# Patient Record
Sex: MALE | Race: BLACK OR AFRICAN AMERICAN | Employment: UNEMPLOYED | URBAN - METROPOLITAN AREA
[De-identification: names, ages, dates, MRNs, and addresses within clinical notes are randomized per-mention and may not be internally consistent; named-entity substitution may affect disease eponyms.]

---

## 2017-04-08 ENCOUNTER — APPOINTMENT (OUTPATIENT)
Dept: CT IMAGING | Age: 57
End: 2017-04-08
Attending: EMERGENCY MEDICINE
Payer: COMMERCIAL

## 2017-04-08 ENCOUNTER — HOSPITAL ENCOUNTER (EMERGENCY)
Age: 57
Discharge: HOME | End: 2017-04-08
Attending: EMERGENCY MEDICINE
Payer: COMMERCIAL

## 2017-04-08 VITALS
HEART RATE: 74 BPM | RESPIRATION RATE: 18 BRPM | WEIGHT: 130 LBS | TEMPERATURE: 98.5 F | HEIGHT: 68 IN | DIASTOLIC BLOOD PRESSURE: 68 MMHG | BODY MASS INDEX: 19.7 KG/M2 | OXYGEN SATURATION: 98 % | SYSTOLIC BLOOD PRESSURE: 145 MMHG

## 2017-04-08 DIAGNOSIS — K52.9 NONINFECTIOUS GASTROENTERITIS, UNSPECIFIED TYPE: Primary | ICD-10-CM

## 2017-04-08 LAB
ALBUMIN SERPL BCP-MCNC: 2.8 G/DL (ref 3.5–5)
ALBUMIN/GLOB SERPL: 0.9 {RATIO} (ref 1.2–3.5)
ALP SERPL-CCNC: 134 U/L (ref 50–136)
ALT SERPL-CCNC: 76 U/L (ref 12–65)
ANION GAP BLD CALC-SCNC: 6 MMOL/L (ref 7–16)
AST SERPL W P-5'-P-CCNC: 46 U/L (ref 15–37)
BASOPHILS # BLD AUTO: 0 K/UL (ref 0–0.2)
BASOPHILS # BLD: 0 % (ref 0–2)
BILIRUB SERPL-MCNC: 0.3 MG/DL (ref 0.2–1.1)
BUN SERPL-MCNC: 9 MG/DL (ref 6–23)
CALCIUM SERPL-MCNC: 7.4 MG/DL (ref 8.3–10.4)
CHLORIDE SERPL-SCNC: 115 MMOL/L (ref 98–107)
CO2 SERPL-SCNC: 25 MMOL/L (ref 21–32)
CREAT SERPL-MCNC: 1.5 MG/DL (ref 0.8–1.5)
DIFFERENTIAL METHOD BLD: ABNORMAL
EOSINOPHIL # BLD: 0 K/UL (ref 0–0.8)
EOSINOPHIL NFR BLD: 0 % (ref 0.5–7.8)
ERYTHROCYTE [DISTWIDTH] IN BLOOD BY AUTOMATED COUNT: 16 % (ref 11.9–14.6)
GLOBULIN SER CALC-MCNC: 3.1 G/DL (ref 2.3–3.5)
GLUCOSE SERPL-MCNC: 140 MG/DL (ref 65–100)
HCT VFR BLD AUTO: 23.2 % (ref 41.1–50.3)
HGB BLD-MCNC: 8 G/DL (ref 13.6–17.2)
IMM GRANULOCYTES # BLD: 0 K/UL (ref 0–0.5)
IMM GRANULOCYTES NFR BLD AUTO: 0 % (ref 0–5)
LIPASE SERPL-CCNC: 57 U/L (ref 73–393)
LYMPHOCYTES # BLD AUTO: 11 % (ref 13–44)
LYMPHOCYTES # BLD: 0.6 K/UL (ref 0.5–4.6)
MCH RBC QN AUTO: 34.2 PG (ref 26.1–32.9)
MCHC RBC AUTO-ENTMCNC: 34.5 G/DL (ref 31.4–35)
MCV RBC AUTO: 99.1 FL (ref 79.6–97.8)
MONOCYTES # BLD: 0.7 K/UL (ref 0.1–1.3)
MONOCYTES NFR BLD AUTO: 14 % (ref 4–12)
NEUTS SEG # BLD: 3.8 K/UL (ref 1.7–8.2)
NEUTS SEG NFR BLD AUTO: 75 % (ref 43–78)
PLATELET # BLD AUTO: 261 K/UL (ref 150–450)
PMV BLD AUTO: 10.2 FL (ref 10.8–14.1)
POTASSIUM SERPL-SCNC: 4.1 MMOL/L (ref 3.5–5.1)
PROT SERPL-MCNC: 5.9 G/DL (ref 6.3–8.2)
RBC # BLD AUTO: 2.34 M/UL (ref 4.23–5.67)
SODIUM SERPL-SCNC: 146 MMOL/L (ref 136–145)
WBC # BLD AUTO: 5 K/UL (ref 4.3–11.1)

## 2017-04-08 PROCEDURE — 74177 CT ABD & PELVIS W/CONTRAST: CPT

## 2017-04-08 PROCEDURE — 74011250636 HC RX REV CODE- 250/636: Performed by: EMERGENCY MEDICINE

## 2017-04-08 PROCEDURE — 83690 ASSAY OF LIPASE: CPT | Performed by: EMERGENCY MEDICINE

## 2017-04-08 PROCEDURE — 81003 URINALYSIS AUTO W/O SCOPE: CPT | Performed by: EMERGENCY MEDICINE

## 2017-04-08 PROCEDURE — 74011000258 HC RX REV CODE- 258: Performed by: EMERGENCY MEDICINE

## 2017-04-08 PROCEDURE — 74011636320 HC RX REV CODE- 636/320: Performed by: EMERGENCY MEDICINE

## 2017-04-08 PROCEDURE — 80053 COMPREHEN METABOLIC PANEL: CPT | Performed by: EMERGENCY MEDICINE

## 2017-04-08 PROCEDURE — 99284 EMERGENCY DEPT VISIT MOD MDM: CPT | Performed by: EMERGENCY MEDICINE

## 2017-04-08 PROCEDURE — 96374 THER/PROPH/DIAG INJ IV PUSH: CPT | Performed by: EMERGENCY MEDICINE

## 2017-04-08 PROCEDURE — 96375 TX/PRO/DX INJ NEW DRUG ADDON: CPT | Performed by: EMERGENCY MEDICINE

## 2017-04-08 PROCEDURE — 96361 HYDRATE IV INFUSION ADD-ON: CPT | Performed by: EMERGENCY MEDICINE

## 2017-04-08 PROCEDURE — 85025 COMPLETE CBC W/AUTO DIFF WBC: CPT | Performed by: EMERGENCY MEDICINE

## 2017-04-08 RX ORDER — MORPHINE SULFATE 4 MG/ML
4 INJECTION, SOLUTION INTRAMUSCULAR; INTRAVENOUS
Status: COMPLETED | OUTPATIENT
Start: 2017-04-08 | End: 2017-04-08

## 2017-04-08 RX ORDER — ONDANSETRON 2 MG/ML
4 INJECTION INTRAMUSCULAR; INTRAVENOUS
Status: COMPLETED | OUTPATIENT
Start: 2017-04-08 | End: 2017-04-08

## 2017-04-08 RX ORDER — DOXYLAMINE SUCCINATE AND PYRIDOXINE HYDROCHLORIDE, DELAYED RELEASE TABLETS 10 MG/10 MG 10; 10 MG/1; MG/1
1 TABLET, DELAYED RELEASE ORAL 2 TIMES DAILY
Qty: 14 TAB | Refills: 0 | Status: SHIPPED | OUTPATIENT
Start: 2017-04-08 | End: 2017-04-15

## 2017-04-08 RX ORDER — DIPHENHYDRAMINE HYDROCHLORIDE 50 MG/ML
25 INJECTION, SOLUTION INTRAMUSCULAR; INTRAVENOUS
Status: COMPLETED | OUTPATIENT
Start: 2017-04-08 | End: 2017-04-08

## 2017-04-08 RX ORDER — OXYCODONE AND ACETAMINOPHEN 10; 325 MG/1; MG/1
1 TABLET ORAL
Qty: 15 TAB | Refills: 0 | Status: SHIPPED | OUTPATIENT
Start: 2017-04-08

## 2017-04-08 RX ORDER — SODIUM CHLORIDE 0.9 % (FLUSH) 0.9 %
10 SYRINGE (ML) INJECTION
Status: COMPLETED | OUTPATIENT
Start: 2017-04-08 | End: 2017-04-08

## 2017-04-08 RX ADMIN — MORPHINE SULFATE 4 MG: 4 INJECTION, SOLUTION INTRAMUSCULAR; INTRAVENOUS at 03:55

## 2017-04-08 RX ADMIN — DIPHENHYDRAMINE HYDROCHLORIDE 25 MG: 50 INJECTION, SOLUTION INTRAMUSCULAR; INTRAVENOUS at 03:58

## 2017-04-08 RX ADMIN — SODIUM CHLORIDE 100 ML: 900 INJECTION, SOLUTION INTRAVENOUS at 05:32

## 2017-04-08 RX ADMIN — DIATRIZOATE MEGLUMINE AND DIATRIZOATE SODIUM 30 ML: 600; 100 SOLUTION ORAL; RECTAL at 04:00

## 2017-04-08 RX ADMIN — SODIUM CHLORIDE 1000 ML: 900 INJECTION, SOLUTION INTRAVENOUS at 03:52

## 2017-04-08 RX ADMIN — ONDANSETRON 4 MG: 2 INJECTION, SOLUTION INTRAMUSCULAR; INTRAVENOUS at 03:56

## 2017-04-08 RX ADMIN — Medication 10 ML: at 05:32

## 2017-04-08 RX ADMIN — IOPAMIDOL 100 ML: 755 INJECTION, SOLUTION INTRAVENOUS at 05:32

## 2017-04-08 NOTE — DISCHARGE INSTRUCTIONS
Colitis: Care Instructions  Your Care Instructions  Colitis is the medical term for swelling (inflammation) of the intestine. It can be caused by different things, such as an infection or loss of blood flow in the intestine. Other causes are problems like Crohn's disease or ulcerative colitis. Symptoms may include fever, diarrhea that may be bloody, or belly pain. Sometimes symptoms go away without treatment. But you may need treatment or more tests, such as blood tests or a stool test. Or you may need imaging tests like a CT scan or a colonoscopy. In some cases, the doctor may want to test a sample of tissue from the intestine. This test is called a biopsy. The doctor has checked you carefully, but problems can develop later. If you notice any problems or new symptoms, get medical treatment right away. Follow-up care is a key part of your treatment and safety. Be sure to make and go to all appointments, and call your doctor if you are having problems. It's also a good idea to know your test results and keep a list of the medicines you take. How can you care for yourself at home? · Rest until you feel better. · Your doctor may recommend that you eat bland foods. These include rice, dry toast or crackers, bananas, and applesauce. · To prevent dehydration, drink plenty of fluids. Choose water and other caffeine-free clear liquids until you feel better. If you have kidney, heart, or liver disease and have to limit fluids, talk with your doctor before you increase the amount of fluids you drink. · Be safe with medicines. Take your medicines exactly as prescribed. Call your doctor if you think you are having a problem with your medicine. You will get more details on the specific medicines your doctor prescribes. When should you call for help? Call 911 anytime you think you may need emergency care. For example, call if:  · You passed out (lost consciousness).   · You vomit blood or what looks like coffee grounds. · Your stools are maroon or very bloody. Call your doctor now or seek immediate medical care if:  · You have new or worse pain. · You have a new or higher fever. · You have new or worse symptoms. · You cannot keep fluids or medicines down. Watch closely for changes in your health, and be sure to contact your doctor if:  · You do not get better as expected. Where can you learn more? Go to http://augustin-sneha.info/. Diana Michael in the search box to learn more about \"Colitis: Care Instructions. \"  Current as of: August 9, 2016  Content Version: 11.2  © 6008-9961 [employer]. Care instructions adapted under license by [employer] (which disclaims liability or warranty for this information). If you have questions about a medical condition or this instruction, always ask your healthcare professional. [address] any warranty or liability for your use of this information.

## 2017-04-08 NOTE — ED PROVIDER NOTES
HPI Comments: Patient with history of stomach cancer with chemoradiation and surgical removal.  Has had chronic pain and nausea and vomiting with this. Started with pain again tonight and nausea. Has been taking his home pain medications with no relief. This is his first time here as he normally goes to [facility].    Patient is a 64 y.o. male presenting with abdominal pain. The history is provided by the patient and a relative. No  was used. Abdominal Pain    This is a new problem. The current episode started 3 to 5 hours ago. The problem occurs constantly. The problem has been gradually worsening. The pain is associated with an unknown factor. The pain is located in the LLQ. The quality of the pain is aching, cramping and sharp. The pain is moderate. Associated symptoms include diarrhea and nausea. Pertinent negatives include no fever, no melena, no vomiting, no constipation, no dysuria, no hematuria, no headaches, no chest pain and no back pain. Nothing worsens the pain. The pain is relieved by nothing. His past medical history is significant for cancer. The patient's surgical history includes colectomy. Past Medical History:   Diagnosis Date    [facility] [address]     CAD (coronary artery disease)     Cancer ([facility] [address]     stomach    Chronic obstructive pulmonary disease (HCC)     Gastrointestinal disorder     stomach cancer    Hypertension     Psychiatric disorder     Schizoaffective disorder ([facility] [address]     Seizures ([facility] [address]        Past Surgical History:   Procedure Laterality Date    ABDOMEN SURGERY PROC UNLISTED      stomach removed per pt    CARDIAC SURG PROCEDURE UNLIST      CABG    HX ORTHOPAEDIC      right knee replacement         History reviewed. No pertinent family history. Social History     Social History    Marital status:      Spouse name: N/A    Number of children: N/A    Years of education: N/A     Occupational History    Not on file. Social History Main Topics    Smoking status: Former Smoker    Smokeless tobacco: Not on file      Comment: quit 30 years ago    Alcohol use No    Drug use: No    Sexual activity: Not on file     Other Topics Concern    Not on file     Social History Narrative    No narrative on file         ALLERGIES: Abilify [aripiprazole]; Contrast dye [iodine]; Penicillins; and Wellbutrin [bupropion]    Review of Systems   Constitutional: Negative for chills and fever. HENT: Negative for rhinorrhea and sore throat. Eyes: Negative for pain and redness. Respiratory: Negative for chest tightness, shortness of breath and wheezing. Cardiovascular: Negative for chest pain and leg swelling. Gastrointestinal: Positive for abdominal pain, diarrhea and nausea. Negative for constipation, melena and vomiting. Genitourinary: Negative for dysuria and hematuria. Musculoskeletal: Negative for back pain, gait problem, neck pain and neck stiffness. Skin: Negative for color change and rash. Neurological: Negative for weakness, numbness and headaches. Vitals:    04/08/17 0301   BP: 152/70   Pulse: 68   Resp: 20   Temp: 98.1 °F (36.7 °C)   SpO2: 97%   Weight: 59 kg (130 lb)   Height: 5' 8\" (1.727 m)            Physical Exam   Constitutional: He is oriented to person, place, and time. He appears well-developed and well-nourished. HENT:   Head: Normocephalic and atraumatic. Neck: Normal range of motion. Neck supple. Cardiovascular: Normal rate and regular rhythm. No murmur heard. Pulmonary/Chest: Effort normal and breath sounds normal. He has no wheezes. Abdominal: Soft. Bowel sounds are normal. There is tenderness (lower abd worse on left). Musculoskeletal: Normal range of motion. He exhibits no edema. Neurological: He is alert and oriented to person, place, and time. Skin: Skin is warm and dry. Nursing note and vitals reviewed.        MDM  Number of Diagnoses or Management Options  Diagnosis management comments: Colitis. Will hold off on abx due to recent treatment with abx per wife and bad side effect. Will refer to GI. Amount and/or Complexity of Data Reviewed  Clinical lab tests: ordered and reviewed  Tests in the radiology section of CPT®: ordered and reviewed  Tests in the medicine section of CPT®: ordered and reviewed    Patient Progress  Patient progress: stable    ED Course       Procedures           Results Include:    Recent Results (from the past 24 hour(s))   CBC WITH AUTOMATED DIFF    Collection Time: 04/08/17  3:00 AM   Result Value Ref Range    WBC 5.0 4.3 - 11.1 K/uL    RBC 2.34 (L) 4.23 - 5.67 M/uL    HGB 8.0 (L) 13.6 - 17.2 g/dL    HCT 23.2 (L) 41.1 - 50.3 %    MCV 99.1 (H) 79.6 - 97.8 FL    MCH 34.2 (H) 26.1 - 32.9 PG    MCHC 34.5 31.4 - 35.0 g/dL    RDW 16.0 (H) 11.9 - 14.6 %    PLATELET 115 588 - 817 K/uL    MPV 10.2 (L) 10.8 - 14.1 FL    DF AUTOMATED      NEUTROPHILS 75 43 - 78 %    LYMPHOCYTES 11 (L) 13 - 44 %    MONOCYTES 14 (H) 4.0 - 12.0 %    EOSINOPHILS 0 (L) 0.5 - 7.8 %    BASOPHILS 0 0.0 - 2.0 %    IMMATURE GRANULOCYTES 0.0 0.0 - 5.0 %    ABS. NEUTROPHILS 3.8 1.7 - 8.2 K/UL    ABS. LYMPHOCYTES 0.6 0.5 - 4.6 K/UL    ABS. MONOCYTES 0.7 0.1 - 1.3 K/UL    ABS. EOSINOPHILS 0.0 0.0 - 0.8 K/UL    ABS. BASOPHILS 0.0 0.0 - 0.2 K/UL    ABS. IMM. GRANS. 0.0 0.0 - 0.5 K/UL   METABOLIC PANEL, COMPREHENSIVE    Collection Time: 04/08/17  3:00 AM   Result Value Ref Range    Sodium 146 (H) 136 - 145 mmol/L    Potassium 4.1 3.5 - 5.1 mmol/L    Chloride 115 (H) 98 - 107 mmol/L    CO2 25 21 - 32 mmol/L    Anion gap 6 (L) 7 - 16 mmol/L    Glucose 140 (H) 65 - 100 mg/dL    BUN 9 6 - 23 MG/DL    Creatinine 1.50 0.8 - 1.5 MG/DL    GFR est AA >60 >60 ml/min/1.73m2    GFR est non-AA 51 (L) >60 ml/min/1.73m2    Calcium 7.4 (L) 8.3 - 10.4 MG/DL    Bilirubin, total 0.3 0.2 - 1.1 MG/DL    ALT (SGPT) 76 (H) 12 - 65 U/L    AST (SGOT) 46 (H) 15 - 37 U/L    Alk.  phosphatase 134 50 - 136 U/L Protein, total 5.9 (L) 6.3 - 8.2 g/dL    Albumin 2.8 (L) 3.5 - 5.0 g/dL    Globulin 3.1 2.3 - 3.5 g/dL    A-G Ratio 0.9 (L) 1.2 - 3.5     LIPASE    Collection Time: 04/08/17  3:00 AM   Result Value Ref Range    Lipase 57 (L) 73 - 393 U/L      CT ABD PELV W CONT (Final result) Result time: 04/08/17 06:35:52     Final result by Awilda Erwin MD (04/08/17 06:35:52)     Impression:     IMPRESSION:    1. Wall thickening of multiple small bowel loops as well as the sigmoid colon. Findings suggest enterocolitis. This may be infectious or inflammatory. No bowel  obstruction. 2. Periportal edema, nonspecific. 3. A 2 cm left adrenal nodule, incompletely evaluated but may be metastasis. Prior examination, if available, would be beneficial for comparison.             Narrative:     CT abdomen and pelvis with contrast     COMPARISON: None. INDICATION: Abdominal pain. History of stomach cancer. .    TECHNIQUE: CT imaging was performed of the abdomen and pelvis following the  uncomplicated administration of intravenous contrast (Isovue 370, 100 mL).  Oral  contrast was administered. Radiation dose reduction techniques were used for  this study:  Our CT scanners use one or all of the following: Automated exposure  control, adjustment of the mA and/or kVp according to patient's size, iterative  reconstruction. FINDINGS:    There is mild right basilar subsegmental atelectasis. Abdomen findings:    Gallbladder is unremarkable. There is periportal edema, nonspecific. A 6 mm  low-density lesion at the superior right hepatic lobe, too small to characterize  but may be hemangioma or cyst. There are postsurgical changes in the area of GE  junction. Suggestion of prior gastrectomy. Pancreas is atrophic. Spleen is unremarkable. Right adrenal gland is within  normal limits. There is an approximately 2 cm heterogeneous left adrenal nodule. Kidneys are unremarkable without hydronephrosis.  Abdominal aorta is normal in  course and caliber with moderate atherosclerotic calcifications. No abnormally  enlarged lymph node is identified. Pelvic findings:    Multiple small bowel loops in the mid to lower abdomen demonstrate wall  thickening. No small bowel obstruction. There is mild wall thickening of the  sigmoid colon. No large bowel obstruction. No evidence of appendicitis. Urinary  bladder is normal in contour. There is small free fluid in the right paracolic gutter. There is no free air.   There is mottling appearance of the bones, possibly due to osteopenia

## 2017-11-24 ENCOUNTER — HOSPITAL ENCOUNTER (EMERGENCY)
Age: 57
Discharge: HOME | End: 2017-11-24
Attending: EMERGENCY MEDICINE
Payer: COMMERCIAL

## 2017-11-24 ENCOUNTER — APPOINTMENT (OUTPATIENT)
Dept: GENERAL RADIOLOGY | Age: 57
End: 2017-11-24
Attending: EMERGENCY MEDICINE
Payer: COMMERCIAL

## 2017-11-24 VITALS
DIASTOLIC BLOOD PRESSURE: 79 MMHG | WEIGHT: 148 LBS | TEMPERATURE: 98.4 F | HEIGHT: 68 IN | OXYGEN SATURATION: 95 % | BODY MASS INDEX: 22.43 KG/M2 | RESPIRATION RATE: 16 BRPM | HEART RATE: 53 BPM | SYSTOLIC BLOOD PRESSURE: 175 MMHG

## 2017-11-24 DIAGNOSIS — I89.0 LYMPHEDEMA: Primary | ICD-10-CM

## 2017-11-24 LAB
ALBUMIN SERPL-MCNC: 2.4 G/DL (ref 3.5–5)
ALBUMIN/GLOB SERPL: 0.9 {RATIO} (ref 1.2–3.5)
ALP SERPL-CCNC: 292 U/L (ref 50–136)
ALT SERPL-CCNC: 236 U/L (ref 12–65)
ANION GAP SERPL CALC-SCNC: 8 MMOL/L (ref 7–16)
AST SERPL-CCNC: 432 U/L (ref 15–37)
BASOPHILS # BLD: 0 K/UL (ref 0–0.2)
BASOPHILS NFR BLD: 0 % (ref 0–2)
BILIRUB SERPL-MCNC: 0.4 MG/DL (ref 0.2–1.1)
BNP SERPL-MCNC: 476 PG/ML
BUN SERPL-MCNC: 14 MG/DL (ref 6–23)
CALCIUM SERPL-MCNC: 7.8 MG/DL (ref 8.3–10.4)
CHLORIDE SERPL-SCNC: 110 MMOL/L (ref 98–107)
CO2 SERPL-SCNC: 29 MMOL/L (ref 21–32)
CREAT SERPL-MCNC: 1.18 MG/DL (ref 0.8–1.5)
DIFFERENTIAL METHOD BLD: ABNORMAL
EOSINOPHIL # BLD: 0 K/UL (ref 0–0.8)
EOSINOPHIL NFR BLD: 1 % (ref 0.5–7.8)
ERYTHROCYTE [DISTWIDTH] IN BLOOD BY AUTOMATED COUNT: 14.7 % (ref 11.9–14.6)
GLOBULIN SER CALC-MCNC: 2.7 G/DL (ref 2.3–3.5)
GLUCOSE SERPL-MCNC: 87 MG/DL (ref 65–100)
HCT VFR BLD AUTO: 27.1 % (ref 41.1–50.3)
HGB BLD-MCNC: 9.2 G/DL (ref 13.6–17.2)
IMM GRANULOCYTES # BLD: 0 K/UL (ref 0–0.5)
IMM GRANULOCYTES NFR BLD AUTO: 0 % (ref 0–5)
LYMPHOCYTES # BLD: 0.8 K/UL (ref 0.5–4.6)
LYMPHOCYTES NFR BLD: 20 % (ref 13–44)
MCH RBC QN AUTO: 36.4 PG (ref 26.1–32.9)
MCHC RBC AUTO-ENTMCNC: 33.9 G/DL (ref 31.4–35)
MCV RBC AUTO: 107.1 FL (ref 79.6–97.8)
MONOCYTES # BLD: 0.4 K/UL (ref 0.1–1.3)
MONOCYTES NFR BLD: 10 % (ref 4–12)
NEUTS SEG # BLD: 2.8 K/UL (ref 1.7–8.2)
NEUTS SEG NFR BLD: 69 % (ref 43–78)
PLATELET # BLD AUTO: 119 K/UL (ref 150–450)
PMV BLD AUTO: 10.1 FL (ref 10.8–14.1)
POTASSIUM SERPL-SCNC: 4 MMOL/L (ref 3.5–5.1)
PROT SERPL-MCNC: 5.1 G/DL (ref 6.3–8.2)
RBC # BLD AUTO: 2.53 M/UL (ref 4.23–5.67)
SODIUM SERPL-SCNC: 147 MMOL/L (ref 136–145)
WBC # BLD AUTO: 4.1 K/UL (ref 4.3–11.1)

## 2017-11-24 PROCEDURE — 83880 ASSAY OF NATRIURETIC PEPTIDE: CPT | Performed by: EMERGENCY MEDICINE

## 2017-11-24 PROCEDURE — 71020 XR CHEST PA LAT: CPT

## 2017-11-24 PROCEDURE — 96374 THER/PROPH/DIAG INJ IV PUSH: CPT | Performed by: EMERGENCY MEDICINE

## 2017-11-24 PROCEDURE — 81003 URINALYSIS AUTO W/O SCOPE: CPT | Performed by: EMERGENCY MEDICINE

## 2017-11-24 PROCEDURE — 99283 EMERGENCY DEPT VISIT LOW MDM: CPT | Performed by: EMERGENCY MEDICINE

## 2017-11-24 PROCEDURE — 74011250636 HC RX REV CODE- 250/636: Performed by: EMERGENCY MEDICINE

## 2017-11-24 PROCEDURE — 85025 COMPLETE CBC W/AUTO DIFF WBC: CPT | Performed by: EMERGENCY MEDICINE

## 2017-11-24 PROCEDURE — 80053 COMPREHEN METABOLIC PANEL: CPT | Performed by: EMERGENCY MEDICINE

## 2017-11-24 RX ORDER — FUROSEMIDE 10 MG/ML
40 INJECTION INTRAMUSCULAR; INTRAVENOUS
Status: COMPLETED | OUTPATIENT
Start: 2017-11-24 | End: 2017-11-24

## 2017-11-24 RX ORDER — FUROSEMIDE 40 MG/1
20 TABLET ORAL DAILY
Qty: 10 TAB | Refills: 0 | Status: SHIPPED | OUTPATIENT
Start: 2017-11-24 | End: 2017-12-14

## 2017-11-24 RX ADMIN — FUROSEMIDE 40 MG: 10 INJECTION, SOLUTION INTRAMUSCULAR; INTRAVENOUS at 16:15

## 2017-11-24 NOTE — ED PROVIDER NOTES
HPI Comments: Patient is a 63 yo male with history of stomach cancer s/p gastrectomy presents with leg swelling. Patient states had had this problem since last year after the gastrectomy when he also had multiple lymph nodes removed. States swelling continues to progress. States taken off diuretics one month prior and feels it has gotten worse since that time. Denies any chest pain, no SOB, no nausea or vomiting, no fevers or chills. States he is able to urinate but difficult to control the stream while he is urinating due to the swelling. Overall patient is well appearing, NAD. Patient is a 64 y.o. male presenting with other event. The history is provided by the patient. No  was used. Other   Pertinent negatives include no chest pain, no abdominal pain, no headaches and no shortness of breath. Past Medical History:   Diagnosis Date    B[facility] 1 [facility] ([facility] [address]     CAD (coronary artery disease)     Cancer ([facility] [address]     stomach    Chronic obstructive pulmonary disease (HCC)     Gastrointestinal disorder     stomach cancer    Hypertension     Psychiatric disorder     Schizoaffective disorder ([facility] [address]     Seizures ([facility] [address]        Past Surgical History:   Procedure Laterality Date    ABDOMEN SURGERY PROC UNLISTED      stomach removed per pt    CARDIAC SURG PROCEDURE UNLIST      CABG    HX ORTHOPAEDIC      right knee replacement         No family history on file. Social History     Social History    Marital status:      Spouse name: N/A    Number of children: N/A    Years of education: N/A     Occupational History    Not on file. Social History Main Topics    Smoking status: Former Smoker    Smokeless tobacco: Not on file      Comment: quit 30 years ago    Alcohol use No    Drug use: No    Sexual activity: Not on file     Other Topics Concern    Not on file     Social History Narrative    No narrative on file         ALLERGIES: Abilify [aripiprazole];  Contrast dye [iodine]; Penicillins; and Wellbutrin [bupropion]    Review of Systems   Constitutional: Negative for chills and fever. HENT: Negative for rhinorrhea and sore throat. Eyes: Negative for visual disturbance. Respiratory: Negative for cough and shortness of breath. Cardiovascular: Positive for leg swelling. Negative for chest pain. Gastrointestinal: Negative for abdominal pain, diarrhea, nausea and vomiting. Genitourinary: Negative for dysuria. Musculoskeletal: Negative for back pain and neck pain. Skin: Negative for rash. Neurological: Negative for weakness and headaches. Psychiatric/Behavioral: The patient is not nervous/anxious. Vitals:    11/24/17 1400   BP: 181/86   Pulse: 68   Resp: 17   Temp: 98.9 °F (37.2 °C)   SpO2: 99%   Weight: 67.1 kg (148 lb)   Height: 5' 8\" (1.727 m)            Physical Exam   Constitutional: He is oriented to person, place, and time. He appears well-developed and well-nourished. HENT:   Head: Normocephalic. Right Ear: External ear normal.   Left Ear: External ear normal.   Eyes: Conjunctivae and EOM are normal. Pupils are equal, round, and reactive to light. Neck: Normal range of motion. Neck supple. No tracheal deviation present. Cardiovascular: Normal rate, regular rhythm, normal heart sounds and intact distal pulses. No murmur heard. Pulmonary/Chest: Effort normal and breath sounds normal. No respiratory distress. Abdominal: Soft. There is no tenderness. Genitourinary:   Genitourinary Comments: Scrotum very mildly swollen, uncircumcised penis with easily reducible foreskin. No testicular redness or pain to palpation. On exam states swelling now improved from yesterday. Musculoskeletal: Normal range of motion. He exhibits edema. 2+ edema to bilateral lower extremities. DP pulses 2+/strong bilaterally, ROM fully intact of knee/ankle/leg. Neurological: He is alert and oriented to person, place, and time. No cranial nerve deficit. Skin: No rash noted. Nursing note and vitals reviewed. MDM  Number of Diagnoses or Management Options     Amount and/or Complexity of Data Reviewed  Clinical lab tests: ordered and reviewed  Tests in the radiology section of CPT®: ordered and reviewed  Review and summarize past medical records: yes    Risk of Complications, Morbidity, and/or Mortality  Presenting problems: high  Diagnostic procedures: high  Management options: high    Patient Progress  Patient progress: stable    ED Course       Procedures    Recent Results (from the past 12 hour(s))   CBC WITH AUTOMATED DIFF    Collection Time: 11/24/17  2:09 PM   Result Value Ref Range    WBC 4.1 (L) 4.3 - 11.1 K/uL    RBC 2.53 (L) 4.23 - 5.67 M/uL    HGB 9.2 (L) 13.6 - 17.2 g/dL    HCT 27.1 (L) 41.1 - 50.3 %    .1 (H) 79.6 - 97.8 FL    MCH 36.4 (H) 26.1 - 32.9 PG    MCHC 33.9 31.4 - 35.0 g/dL    RDW 14.7 (H) 11.9 - 14.6 %    PLATELET 670 (L) 726 - 450 K/uL    MPV 10.1 (L) 10.8 - 14.1 FL    DF AUTOMATED      NEUTROPHILS 69 43 - 78 %    LYMPHOCYTES 20 13 - 44 %    MONOCYTES 10 4.0 - 12.0 %    EOSINOPHILS 1 0.5 - 7.8 %    BASOPHILS 0 0.0 - 2.0 %    IMMATURE GRANULOCYTES 0 0.0 - 5.0 %    ABS. NEUTROPHILS 2.8 1.7 - 8.2 K/UL    ABS. LYMPHOCYTES 0.8 0.5 - 4.6 K/UL    ABS. MONOCYTES 0.4 0.1 - 1.3 K/UL    ABS. EOSINOPHILS 0.0 0.0 - 0.8 K/UL    ABS. BASOPHILS 0.0 0.0 - 0.2 K/UL    ABS. IMM.  GRANS. 0.0 0.0 - 0.5 K/UL   METABOLIC PANEL, COMPREHENSIVE    Collection Time: 11/24/17  2:09 PM   Result Value Ref Range    Sodium 147 (H) 136 - 145 mmol/L    Potassium 4.0 3.5 - 5.1 mmol/L    Chloride 110 (H) 98 - 107 mmol/L    CO2 29 21 - 32 mmol/L    Anion gap 8 7 - 16 mmol/L    Glucose 87 65 - 100 mg/dL    BUN 14 6 - 23 MG/DL    Creatinine 1.18 0.8 - 1.5 MG/DL    GFR est AA >60 >60 ml/min/1.73m2    GFR est non-AA >60 >60 ml/min/1.73m2    Calcium 7.8 (L) 8.3 - 10.4 MG/DL    Bilirubin, total 0.4 0.2 - 1.1 MG/DL    ALT (SGPT) 236 (H) 12 - 65 U/L    AST (SGOT) 432 (H) 15 - 37 U/L    Alk. phosphatase 292 (H) 50 - 136 U/L    Protein, total 5.1 (L) 6.3 - 8.2 g/dL    Albumin 2.4 (L) 3.5 - 5.0 g/dL    Globulin 2.7 2.3 - 3.5 g/dL    A-G Ratio 0.9 (L) 1.2 - 3.5     BNP    Collection Time: 11/24/17  2:09 PM   Result Value Ref Range     pg/mL     Recent Results (from the past 12 hour(s))   CBC WITH AUTOMATED DIFF    Collection Time: 11/24/17  2:09 PM   Result Value Ref Range    WBC 4.1 (L) 4.3 - 11.1 K/uL    RBC 2.53 (L) 4.23 - 5.67 M/uL    HGB 9.2 (L) 13.6 - 17.2 g/dL    HCT 27.1 (L) 41.1 - 50.3 %    .1 (H) 79.6 - 97.8 FL    MCH 36.4 (H) 26.1 - 32.9 PG    MCHC 33.9 31.4 - 35.0 g/dL    RDW 14.7 (H) 11.9 - 14.6 %    PLATELET 618 (L) 144 - 450 K/uL    MPV 10.1 (L) 10.8 - 14.1 FL    DF AUTOMATED      NEUTROPHILS 69 43 - 78 %    LYMPHOCYTES 20 13 - 44 %    MONOCYTES 10 4.0 - 12.0 %    EOSINOPHILS 1 0.5 - 7.8 %    BASOPHILS 0 0.0 - 2.0 %    IMMATURE GRANULOCYTES 0 0.0 - 5.0 %    ABS. NEUTROPHILS 2.8 1.7 - 8.2 K/UL    ABS. LYMPHOCYTES 0.8 0.5 - 4.6 K/UL    ABS. MONOCYTES 0.4 0.1 - 1.3 K/UL    ABS. EOSINOPHILS 0.0 0.0 - 0.8 K/UL    ABS. BASOPHILS 0.0 0.0 - 0.2 K/UL    ABS. IMM. GRANS. 0.0 0.0 - 0.5 K/UL   METABOLIC PANEL, COMPREHENSIVE    Collection Time: 11/24/17  2:09 PM   Result Value Ref Range    Sodium 147 (H) 136 - 145 mmol/L    Potassium 4.0 3.5 - 5.1 mmol/L    Chloride 110 (H) 98 - 107 mmol/L    CO2 29 21 - 32 mmol/L    Anion gap 8 7 - 16 mmol/L    Glucose 87 65 - 100 mg/dL    BUN 14 6 - 23 MG/DL    Creatinine 1.18 0.8 - 1.5 MG/DL    GFR est AA >60 >60 ml/min/1.73m2    GFR est non-AA >60 >60 ml/min/1.73m2    Calcium 7.8 (L) 8.3 - 10.4 MG/DL    Bilirubin, total 0.4 0.2 - 1.1 MG/DL    ALT (SGPT) 236 (H) 12 - 65 U/L    AST (SGOT) 432 (H) 15 - 37 U/L    Alk.  phosphatase 292 (H) 50 - 136 U/L    Protein, total 5.1 (L) 6.3 - 8.2 g/dL    Albumin 2.4 (L) 3.5 - 5.0 g/dL    Globulin 2.7 2.3 - 3.5 g/dL    A-G Ratio 0.9 (L) 1.2 - 3.5     BNP    Collection Time: 11/24/17  2:09 PM Result Value Ref Range     pg/mL        65 yo male with lymphedema of bilateral lower extremities:      Patient very well appearing, NAD, ambulatory in ED, [facility] consistent with multiple notes at [facility], BNP mildly elevated however with NO chest pain or SOB, CXR clear. Will place back on very low dose lasix for symptoms and patient to follow up with PCP and [facility] clinic as scheduled.

## 2017-11-24 NOTE — DISCHARGE INSTRUCTIONS
AS WE DISCUSSED, WE WILL PLACE YOU BACK ON LASIX AT A VERY LOW DOSE.  YOU NEED TO FOLLOW UP WITH YOUR PRIMARY CARE PROVIDER FIRST THING Monday OR RETURN TO THE EMERGENCY DEPARTMENT WITH ANY FURTHER SWELLING, ANY INABILITY TO URINATE, ANY NAUSEA OR VOMITING, FEVERS OR CHILLS, ANY REDNESS TO YOUR LEGS OR ANY FURTHER CONCERNS. Lymphedema: Care Instructions  Your Care Instructions    Lymphedema is fluid that builds up in the arms or legs. It is often caused by surgery to remove lymph nodes during cancer treatment, especially breast cancer surgery, which can cause fluid to build up in the arm. It can happen after radiation treatment to an area that involves lymph nodes. It also can be caused by a fractured bone or surgery to fix a fracture. And some medicines also can cause lymphedema. Some people get it for unknown reasons. Normally, lymph nodes trap bacteria and other substances as fluid flows through them. Then, the white cells in the body's defense, or immune, system can destroy the substances. But if there are few or no lymph nodes-or if the lymph system in an arm or leg has been damaged-fluid can build up in the affected arm or leg. You can take simple steps at home to help treat or prevent fluid buildup. Treatment may include raising the arm or leg to let gravity drain the fluid. You also can wear compression stockings or sleeves. Follow-up care is a key part of your treatment and safety. Be sure to make and go to all appointments, and call your doctor if you are having problems. It's also a good idea to know your test results and keep a list of the medicines you take. How can you care for yourself at home? · Wear a compression stocking or sleeve as your doctor suggests. It can help keep fluid from pooling in an arm or leg. Wear it during air travel. · Prop up the swollen arm or leg on a pillow anytime you sit or lie down. Try to keep it above the level of your heart.  This will help reduce swelling. · Avoid crossing your legs if your legs are swollen. · Get some exercise on most days of the week. Increase the intensity of exercise slowly. Water aerobics can help reduce swelling by helping fluid move around. Wear your compression stocking or sleeve during exercise, but not during water exercise. · See a physical therapist. He or she can teach you how to do self-massage to help fluid move around. You also can learn what activities would be best for you. · Keep your feet clean and wear clean socks or stockings every day. Check your feet often for signs of infection, such as redness or heat. Do not walk barefoot. · If you have had lymph nodes removed from under your arm:  ¨ Do not have blood drawn from the arm on the side of the lymph node surgery. ¨ Do not allow a blood pressure cuff to be placed on that arm. If you are in the hospital, make sure your nurse and other hospital staff know of your condition. ¨ Wear gloves when gardening or doing other activities that may lead to cuts on your fingers or hands. · If you have had lymph nodes removed from your groin:  ¨ Bathe your feet daily in lukewarm, not hot, water. Use a mild soap that has a moisturizer, or use a moisturizer separately. ¨ Check your feet for blisters or cuts. ¨ Wear comfortable and supportive shoes that fit properly. ¨ Wear the correct size of panty hose and stockings. Avoid garters or knee-high or thigh-high stockings. · Ask your doctor how to treat any cuts, scratches, insect bites, or other injuries that may occur. · Use sunscreen and insect repellent when outdoors to protect your skin from sunburn and insect bites. · Wear medical alert jewelry that says you have lymphedema. You can buy these at most [facility] and on the Internet. When should you call for help? Call your doctor now or seek immediate medical care if:  ? · You have signs of infection, such as:  ¨ Increased pain, swelling, warmth, or redness.   ¨ Red streaks leading from the area. ¨ Pus draining from the area. ¨ A fever. ? Watch closely for changes in your health, and be sure to contact your doctor if:  ? · You have new or worse symptoms from lymphedema. ? · You do not get better as expected. Where can you learn more? Go to http://augustin-sneha.info/. Enter V398 in the search box to learn more about \"Lymphedema: Care Instructions. \"  Current as of: May 12, 2017  Content Version: 11.4  © 2817-1096 [employer]. Care instructions adapted under license by [employer] (which disclaims liability or warranty for this information). If you have questions about a medical condition or this instruction, always ask your healthcare professional. [address] any warranty or liability for your use of this information. Furosemide (By mouth)   Furosemide (eofk-SV-dg-mide)  Treats fluid retention (edema) and high blood pressure. This medicine is a diuretic (water pill). Brand Name(s): Active-Medicated Specimen Collection Kit, Diuscreen Multi-Drug Medicated Test Kit, Lasix, RX-Specimen Collection Kit, Specimen Collection Kit   There may be other brand names for this medicine. When This Medicine Should Not Be Used: This medicine is not right for everyone. Do not use it if you had an allergic reaction to furosemide. How to Use This Medicine:   Liquid, Tablet  · Take your medicine as directed. Your dose may need to be changed several times to find what works best for you. · You may take this medicine with food if it upsets your stomach. · Oral liquid: Measure the oral liquid medicine with a marked measuring spoon, oral syringe, or medicine cup. · Tablet: Swallow the tablet whole. Do not crush, break, or chew it. · Missed dose: Take a dose as soon as you remember. If it is almost time for your next dose, wait until then and take a regular dose.  Do not take extra medicine to make up for a missed dose.  · Store the medicine in a closed container at room temperature, away from heat, moisture, and direct light. Drugs and Foods to Avoid:   Ask your doctor or pharmacist before using any other medicine, including over-the-counter medicines, vitamins, and herbal products. · Some medicines can affect how furosemide works. Tell your doctor if you are also using any of the following:  ¨ Cisplatin, cyclosporine, digoxin, ethacrynic acid, licorice, lithium, methotrexate, or phenytoin  ¨ Adrenocorticotropic hormone (ACTH)  ¨ Laxative  ¨ Medicine to treat an infection  ¨ NSAID pain or arthritis medicine (including aspirin, diclofenac, ibuprofen, indomethacin, naproxen)  ¨ Other blood pressure medicines  ¨ Steroid medicine (including dexamethasone, hydrocortisone, methylprednisolone, prednisolone, prednisone)  ¨ Thyroid medicine  · If you also take sucralfate, allow at least 2 hours between the time you take furosemide and the time you take sucralfate. · Alcohol, narcotic pain medicine, or sleeping pills may cause you to feel more lightheaded, dizzy, or faint when used with this medicine. Warnings While Using This Medicine:   · Tell your doctor if you are pregnant or breastfeeding, or if you have kidney disease, liver disease (including cirrhosis), diabetes, gout, low blood pressure, lupus, an enlarged prostate, trouble urinating, or an allergy to sulfa drugs. Tell your doctor if you are on a low-salt diet. · This medicine may cause the following problems:   ¨ Low levels of minerals in your blood, such as potassium and sodium  ¨ Blood sugar level changes  ¨ Hearing problems  · Make sure any doctor or dentist who treats you knows that you are using this medicine. · This medicine could lower your blood pressure too much, especially when you first use it or if you are dehydrated. Stand or sit up slowly if you feel lightheaded or dizzy. · This medicine may make your skin more sensitive to sunlight. Wear sunscreen.  Do not use sunlamps or tanning beds. · Your doctor will do lab tests at regular visits to check on the effects of this medicine. Keep all appointments. · Keep all medicine out of the reach of children. Never share your medicine with anyone. Possible Side Effects While Using This Medicine:   Call your doctor right away if you notice any of these side effects:  · Allergic reaction: Itching or hives, swelling in your face or hands, swelling or tingling in your mouth or throat, chest tightness, trouble breathing  · Blistering, peeling, red skin rash  · Confusion, weakness, muscle twitching  · Dry mouth, increased thirst, muscle cramps, uneven heartbeat  · Sudden and severe stomach pain, nausea, vomiting, fever, lightheadedness  · Hearing loss, ringing in the ears  · Lightheadedness, dizziness, fainting  · Severe diarrhea  · Unusual bleeding or bruising  · Yellow skin or eyes  If you notice these less serious side effects, talk with your doctor:   · Loss of appetite, stomach cramps  If you notice other side effects that you think are caused by this medicine, tell your doctor. Call your doctor for medical advice about side effects. You may report side effects to FDA at 6-882-FDA-7675  © 2017 [address] Information is for End User's use only and may not be sold, redistributed or otherwise used for commercial purposes. The above information is an  only. It is not intended as medical advice for individual conditions or treatments. Talk to your doctor, nurse or pharmacist before following any medical regimen to see if it is safe and effective for you.

## 2017-11-24 NOTE — ED NOTES
I have reviewed discharge instructions with the patient. The patient verbalized understanding. Patient left ED via Discharge Method: ambulatory to Home with family. Opportunity for questions and clarification provided. Patient given 1 scripts. To continue your aftercare when you leave the hospital, you may receive an automated call from our care team to check in on how you are doing. This is a free service and part of our promise to provide the best care and service to meet your aftercare needs.  If you have questions, or wish to unsubscribe from this service please call [phone]. Thank you for Choosing our [facility].

## 2017-11-24 NOTE — ED TRIAGE NOTES
Pt co swelling from waist down and extremely swollen testicles.   States he is hurting all over below the waist.

## 2018-07-06 ENCOUNTER — HOSPITAL ENCOUNTER (INPATIENT)
Age: 58
LOS: 6 days | Discharge: HOME | DRG: 245 | End: 2018-07-12
Attending: EMERGENCY MEDICINE | Admitting: FAMILY MEDICINE
Payer: COMMERCIAL

## 2018-07-06 ENCOUNTER — APPOINTMENT (OUTPATIENT)
Dept: GENERAL RADIOLOGY | Age: 58
DRG: 245 | End: 2018-07-06
Attending: EMERGENCY MEDICINE
Payer: COMMERCIAL

## 2018-07-06 DIAGNOSIS — D64.9 ANEMIA, UNSPECIFIED TYPE: ICD-10-CM

## 2018-07-06 DIAGNOSIS — E87.0 HYPERNATREMIA: ICD-10-CM

## 2018-07-06 DIAGNOSIS — K51.00 PANCOLITIS (HCC): Primary | ICD-10-CM

## 2018-07-06 DIAGNOSIS — E83.51 HYPOCALCEMIA: ICD-10-CM

## 2018-07-06 PROBLEM — I10 ESSENTIAL HYPERTENSION: Chronic | Status: ACTIVE | Noted: 2018-07-06

## 2018-07-06 PROBLEM — J43.1 PANLOBULAR EMPHYSEMA (HCC): Chronic | Status: ACTIVE | Noted: 2018-07-06

## 2018-07-06 PROBLEM — I27.82 CHRONIC PULMONARY EMBOLISM (HCC): Chronic | Status: ACTIVE | Noted: 2018-07-06

## 2018-07-06 PROBLEM — R11.2 N&V (NAUSEA AND VOMITING): Status: ACTIVE | Noted: 2018-07-06

## 2018-07-06 LAB
ALBUMIN SERPL-MCNC: 1.9 G/DL (ref 3.5–5)
ALBUMIN/GLOB SERPL: 0.8 {RATIO} (ref 1.2–3.5)
ALP SERPL-CCNC: 89 U/L (ref 50–136)
ALT SERPL-CCNC: 37 U/L (ref 12–65)
ANION GAP SERPL CALC-SCNC: 6 MMOL/L (ref 7–16)
AST SERPL-CCNC: 28 U/L (ref 15–37)
BASOPHILS # BLD: 0 K/UL (ref 0–0.2)
BASOPHILS NFR BLD: 0 % (ref 0–2)
BILIRUB SERPL-MCNC: 0.2 MG/DL (ref 0.2–1.1)
BNP SERPL-MCNC: 310 PG/ML
BUN SERPL-MCNC: 7 MG/DL (ref 6–23)
CALCIUM SERPL-MCNC: 7.1 MG/DL (ref 8.3–10.4)
CHLORIDE SERPL-SCNC: 121 MMOL/L (ref 98–107)
CO2 SERPL-SCNC: 24 MMOL/L (ref 21–32)
CREAT SERPL-MCNC: 1.07 MG/DL (ref 0.8–1.5)
CRP SERPL-MCNC: <0.3 MG/DL (ref 0–0.9)
DIFFERENTIAL METHOD BLD: ABNORMAL
EOSINOPHIL # BLD: 0.1 K/UL (ref 0–0.8)
EOSINOPHIL NFR BLD: 1 % (ref 0.5–7.8)
ERYTHROCYTE [DISTWIDTH] IN BLOOD BY AUTOMATED COUNT: 17.4 % (ref 11.9–14.6)
GLOBULIN SER CALC-MCNC: 2.3 G/DL (ref 2.3–3.5)
GLUCOSE SERPL-MCNC: 72 MG/DL (ref 65–100)
HCT VFR BLD AUTO: 20.7 % (ref 41.1–50.3)
HGB BLD-MCNC: 7.1 G/DL (ref 13.6–17.2)
IMM GRANULOCYTES # BLD: 0 K/UL (ref 0–0.5)
IMM GRANULOCYTES NFR BLD AUTO: 0 % (ref 0–5)
LACTATE BLD-SCNC: 2 MMOL/L (ref 0.5–1.9)
LIPASE SERPL-CCNC: 29 U/L (ref 73–393)
LYMPHOCYTES # BLD: 0.9 K/UL (ref 0.5–4.6)
LYMPHOCYTES NFR BLD: 18 % (ref 13–44)
MCH RBC QN AUTO: 36 PG (ref 26.1–32.9)
MCHC RBC AUTO-ENTMCNC: 34.3 G/DL (ref 31.4–35)
MCV RBC AUTO: 105.1 FL (ref 79.6–97.8)
MONOCYTES # BLD: 0.5 K/UL (ref 0.1–1.3)
MONOCYTES NFR BLD: 9 % (ref 4–12)
NEUTS SEG # BLD: 3.6 K/UL (ref 1.7–8.2)
NEUTS SEG NFR BLD: 72 % (ref 43–78)
PLATELET # BLD AUTO: 101 K/UL (ref 150–450)
PMV BLD AUTO: 10.6 FL (ref 10.8–14.1)
POTASSIUM SERPL-SCNC: 3.9 MMOL/L (ref 3.5–5.1)
PROT SERPL-MCNC: 4.2 G/DL (ref 6.3–8.2)
RBC # BLD AUTO: 1.97 M/UL (ref 4.23–5.67)
SODIUM SERPL-SCNC: 151 MMOL/L (ref 136–145)
WBC # BLD AUTO: 5.1 K/UL (ref 4.3–11.1)

## 2018-07-06 PROCEDURE — 85025 COMPLETE CBC W/AUTO DIFF WBC: CPT | Performed by: EMERGENCY MEDICINE

## 2018-07-06 PROCEDURE — 80053 COMPREHEN METABOLIC PANEL: CPT | Performed by: EMERGENCY MEDICINE

## 2018-07-06 PROCEDURE — 83690 ASSAY OF LIPASE: CPT | Performed by: EMERGENCY MEDICINE

## 2018-07-06 PROCEDURE — 96374 THER/PROPH/DIAG INJ IV PUSH: CPT | Performed by: EMERGENCY MEDICINE

## 2018-07-06 PROCEDURE — 30233N1 TRANSFUSION OF NONAUTOLOGOUS RED BLOOD CELLS INTO PERIPHERAL VEIN, PERCUTANEOUS APPROACH: ICD-10-PCS | Performed by: FAMILY MEDICINE

## 2018-07-06 PROCEDURE — 96375 TX/PRO/DX INJ NEW DRUG ADDON: CPT | Performed by: EMERGENCY MEDICINE

## 2018-07-06 PROCEDURE — 83605 ASSAY OF LACTIC ACID: CPT

## 2018-07-06 PROCEDURE — 93005 ELECTROCARDIOGRAM TRACING: CPT | Performed by: EMERGENCY MEDICINE

## 2018-07-06 PROCEDURE — 83880 ASSAY OF NATRIURETIC PEPTIDE: CPT | Performed by: EMERGENCY MEDICINE

## 2018-07-06 PROCEDURE — 99285 EMERGENCY DEPT VISIT HI MDM: CPT | Performed by: EMERGENCY MEDICINE

## 2018-07-06 PROCEDURE — 74011250636 HC RX REV CODE- 250/636: Performed by: EMERGENCY MEDICINE

## 2018-07-06 PROCEDURE — 71046 X-RAY EXAM CHEST 2 VIEWS: CPT

## 2018-07-06 PROCEDURE — 65270000029 HC RM PRIVATE

## 2018-07-06 PROCEDURE — 86140 C-REACTIVE PROTEIN: CPT | Performed by: EMERGENCY MEDICINE

## 2018-07-06 RX ORDER — HYDROMORPHONE HYDROCHLORIDE 2 MG/ML
1 INJECTION, SOLUTION INTRAMUSCULAR; INTRAVENOUS; SUBCUTANEOUS ONCE
Status: COMPLETED | OUTPATIENT
Start: 2018-07-06 | End: 2018-07-06

## 2018-07-06 RX ORDER — ONDANSETRON 2 MG/ML
4 INJECTION INTRAMUSCULAR; INTRAVENOUS
Status: COMPLETED | OUTPATIENT
Start: 2018-07-06 | End: 2018-07-06

## 2018-07-06 RX ADMIN — HYDROMORPHONE HYDROCHLORIDE 1 MG: 2 INJECTION, SOLUTION INTRAMUSCULAR; INTRAVENOUS; SUBCUTANEOUS at 21:02

## 2018-07-06 RX ADMIN — ONDANSETRON 4 MG: 2 INJECTION INTRAMUSCULAR; INTRAVENOUS at 21:03

## 2018-07-06 NOTE — IP AVS SNAPSHOT
Amanda Aritabird 
 
 
 [address] [facility] [address] [facility] 
[phone] Patient: Austin Jolley MRN: [id number] About your hospitalization You were admitted on:  July 6, 2018 You last received care in the:  [facility] 6 MED SURG You were discharged on:  July 12, 2018 Why you were hospitalized Your primary diagnosis was:  Pancolitis (Hcc) Your diagnoses also included:  N&V (Nausea And Vomiting), Essential Hypertension, Panlobular Emphysema (Hcc), Anemia, Chronic Pulmonary Embolism (Hcc), Hypoalbuminemia, Gastric Cancer (Hcc), Copd (Chronic Obstructive Pulmonary Disease) (Hcc), Johnny On Cpap, Hypernatremia, Cad (Coronary Artery Disease), Hx Of Cabg Follow-up Information Follow up With Details Comments Contact Info Herber Collins MD On 7/19/2018 at 9:30 [address] [address] [address] [facility]  office will call patient with appointment date and time. [facility] [address] 
[phone] Discharge Orders None A check perla indicates which time of day the medication should be taken. My Medications START taking these medications Instructions Each Dose to Equal  
 Morning Noon Evening Bedtime  
 amLODIPine 10 mg tablet Commonly known as:  Jose Luis Chandnier Your next dose is:  7-13 Take 1 Tab by mouth daily. 10 mg  
    
   
   
   
  
 folic acid 1 mg tablet Commonly known as:  [employer] Your next dose is:  7-13 Take 1 Tab by mouth daily. 1 mg  
    
   
   
   
  
 furosemide 40 mg tablet Commonly known as:  LASIX Your next dose is:  7-13 Take 1 Tab by mouth daily. 40 mg  
    
   
   
   
  
 guaiFENesin  mg ER tablet Commonly known as:  Ezequiel & Ezequiel Your next dose is: This evening Take 1 Tab by mouth every twelve (12) hours.   
 600 mg  
    
   
   
  
   
  
 metoprolol tartrate 50 mg tablet Commonly known as:  LOPRESSOR Your next dose is: This evening Take 1 Tab by mouth every twelve (12) hours. 50 mg CONTINUE taking these medications Instructions Each Dose to Equal  
 Morning Noon Evening Bedtime  
 oxyCODONE-acetaminophen  mg per tablet Commonly known as:  PERCOCET Your next dose is:  As needed Take 1 Tab by mouth every six (6) hours as needed for Pain. Max Daily Amount: 4 Tabs. 1 Tab Where to Get Your Medications Information on where to get these meds will be given to you by the nurse or doctor. ! Ask your nurse or doctor about these medications  
  amLODIPine 10 mg tablet  
 folic acid 1 mg tablet  
 furosemide 40 mg tablet  
 guaiFENesin  mg ER tablet  
 metoprolol tartrate 50 mg tablet Opioid Education Prescription Opioids: What You Need to Know: 
 
 
 
F-face looks uneven A-arms unable to move or move unevenly S-speech slurred or non-existent T-time-call 911 as soon as signs and symptoms begin-DO NOT go Back to bed or wait to see if you get better-TIME IS BRAIN. Warning Signs of HEART ATTACK Call 911 if you have these symptoms: 
? Chest discomfort.  Most heart attacks involve discomfort in the center of the chest that lasts more than a few minutes, or that goes away and comes back. It can feel like uncomfortable pressure, squeezing, fullness, or pain. ? Discomfort in other areas of the upper body. Symptoms can include pain or discomfort in one or both arms, the back, neck, jaw, or stomach. ? Shortness of breath with or without chest discomfort. ? Other signs may include breaking out in a cold sweat, nausea, or lightheadedness. Don't wait more than five minutes to call [address]! Fast action can save your life. Calling 911 is almost always the fastest way to get lifesaving treatment. Emergency Medical Services staff can begin treatment when they arrive  up to an hour sooner than if someone gets to the hospital by car. The discharge information has been reviewed with the patient. The patient verbalized understanding. Discharge medications reviewed with the patient and appropriate educational materials and side effects teaching were provided. ___________________________________________________________________________________________________________________________________ [employer] Announcement We are excited to announce that we are making your provider's discharge notes available to you in SnapMyAd. You will see these notes when they are completed and signed by the physician that discharged you from your recent hospital stay. If you have any questions or concerns about any information you see in evlyt, please call the Health Information Department where you were seen or reach out to your Primary Care Provider for more information about your plan of care. Introducing [facility] SERVICES! [employer] introduces SnapMyAd patient portal. Now you can access parts of your medical record, email your doctor's office, and request medication refills online. 1. In your internet browser, go to [email or web address] 2. Click on the First Time User? Click Here link in the Sign In box. You will see the New Member Sign Up page. 3. Enter your Sp[facility] Access Code exactly as it appears below. You will not need to use this code after youve completed the sign-up process. If you do not sign up before the expiration date, you must request a new code. · Sp[facility] Management Access Code: [email or web address] Expires: 10/4/2018  6:16 PM 
 
4. Enter the last four digits of your Social Security Number (xxxx) and Date of Birth (mm/dd/yyyy) as indicated and click Submit. You will be taken to the next sign-up page. 5. Create a Spine Pain Management ID. This will be your Sp[facility] Pain Management login ID and cannot be changed, so think of one that is secure and easy to remember. 6. Create a Sp[facility] Management password. You can change your password at any time. 7. Enter your Password Reset Question and Answer. This can be used at a later time if you forget your password. 8. Enter your e-mail address. You will receive e-mail notification when new information is available in [address] 9. Click Sign Up. You can now view and download portions of your medical record. 10. Click the Download Summary menu link to download a portable copy of your medical information. If you have questions, please visit the Frequently Asked Questions section of the Spine Pain Management website. Remember, Sp[facility] Pain Management is NOT to be used for urgent needs. For medical emergencies, dial 911. Now available from your iPhone and Android! Introducing Steven Belcher As a [facility] patient, I wanted to make you aware of our electronic visit tool called Steven Belcher. [facility] 24/7 allows you to connect within minutes with a medical provider 24 hours a day, seven days a week via a mobile device or tablet or logging into a secure website from your computer. You can access Steven Belcher from anywhere in the [place].  
 
A virtual visit might be right for you when you have a simple condition and feel like you just dont want to get out of bed, or cant get away from work for an appointment, when your regular [facility] provider is not available (evenings, weekends or holidays), or when youre out of town and need minor care. Electronic visits cost only $49 and if the [facility] 24/7 provider determines a prescription is needed to treat your condition, one can be electronically transmitted to a nearby pharmacy*. Please take a moment to enroll today if you have not already done so. The enrollment process is free and takes just a few minutes. To enroll, please download the [employer] 24/7 taryn to your tablet or phone, or visit [email or web address] to enroll on your computer. And, as an [address] [place] [address] patient with a 3nder account, the results of your visits will be scanned into your electronic medical record and your primary care provider will be able to view the scanned results. We urge you to continue to see your regular [facility] provider for your ongoing medical care. And while your primary care provider may not be the one available when you seek a [employer] virtual visit, the peace of mind you get from getting a real diagnosis real time can be priceless. For more information on [employer], view our Frequently Asked Questions (FAQs) at [email or web address]. Sincerely, 
 
Fay Bowens MD 
Chief Medical Officer Etta Alia Best *:  certain medications cannot be prescribed via [employer] Unresulted Labs-Please follow up with your PCP about these lab tests Order Current Status CULTURE, BLOOD Preliminary result CULTURE, BLOOD Preliminary result Providers Seen During Your Hospitalization Provider Specialty Primary office phone Jennifer Wilkes DO Emergency Medicine [phone] Ena Wooten MD Family Practice [phone] Immunizations Administered for This Admission Name Date TB Skin Test (PPD) Intradermal  Deferred (), 7/7/2018 Your Primary Care Physician (PCP) Primary Care Physician Office Phone Office Fax Bhumi Chu [phone] You are allergic to the following Allergen Reactions Abilify (Aripiprazole) Unknown (comments) Contrast Dye (Iodine) Hives Nausea and Vomiting Reports he has be premedicated Penicillins Unknown (comments) Wellbutrin (Bupropion) Other (comments) \"locking up\" Recent Documentation Height Weight BMI Smoking Status 1.727 m 75.8 kg 25.39 kg/m2 Former Smoker Emergency Contacts Name Discharge Info Relation Home Work Mobile Grace Jon  Spouse [3] [phone] Patient Belongings The following personal items are in your possession at time of discharge: 
  Dental Appliances: None  Visual Aid: Glasses      Home Medications: None   Jewelry: Ring (5)  Clothing: Footwear, Shirt, Shorts, Socks, Undergarments, With patient    Other Valuables: Chris Jung Discharge Instructions Attachments/References COLITIS (ENGLISH) Patient Handouts Colitis: Care Instructions Your Care Instructions Colitis is the medical term for swelling (inflammation) of the intestine. It can be caused by different things, such as an infection or loss of blood flow in the intestine. Other causes are problems like Crohn's disease or ulcerative colitis. Symptoms may include fever, diarrhea that may be bloody, or belly pain. Sometimes symptoms go away without treatment. But you may need treatment or more tests, such as blood tests or a stool test. Or you may need imaging tests like a CT scan or a colonoscopy. In some cases, the doctor may want to test a sample of tissue from the intestine. This test is called a biopsy. The doctor has checked you carefully, but problems can develop later.  If you notice any problems or new symptoms, get medical treatment right away. Follow-up care is a key part of your treatment and safety. Be sure to make and go to all appointments, and call your doctor if you are having problems. It's also a good idea to know your test results and keep a list of the medicines you take. How can you care for yourself at home? · Rest until you feel better. · Your doctor may recommend that you eat bland foods. These include rice, dry toast or crackers, bananas, and applesauce. · To prevent dehydration, drink plenty of fluids. Choose water and other caffeine-free clear liquids until you feel better. If you have kidney, heart, or liver disease and have to limit fluids, talk with your doctor before you increase the amount of fluids you drink. · Be safe with medicines. Take your medicines exactly as prescribed. Call your doctor if you think you are having a problem with your medicine. You will get more details on the specific medicines your doctor prescribes. When should you call for help? Call 911 anytime you think you may need emergency care. For example, call if: 
  · You passed out (lost consciousness).  
  · Your stools are maroon or very bloody.  
 Call your doctor now or seek immediate medical care if: 
  · You have new or worse belly pain.  
  · You have a fever.  
  · You are vomiting.  
  · You cannot pass stools or gas.  
  · You have new or more blood in your stools.  
 Watch closely for changes in your health, and be sure to contact your doctor if: 
  · You have new or worse symptoms.  
  · You are losing weight.  
  · You do not get better as expected. Where can you learn more? Go to http://augustin-sneha.info/. Cl Do in the search box to learn more about \"Colitis: Care Instructions. \" Current as of: May 12, 2017 Content Version: 11.7 © 5864-9296 [employer].  Care instructions adapted under license by [address] (which disclaims liability or warranty for this information). If you have questions about a medical condition or this instruction, always ask your healthcare professional. [address] any warranty or liability for your use of this information. Please provide this summary of care documentation to your next provider. Signatures-by signing, you are acknowledging that this After Visit Summary has been reviewed with you and you have received a copy. Patient Signature:  ____________________________________________________________ Date:  ____________________________________________________________  
  
[facility] Provider Signature:  ____________________________________________________________ Date:  ____________________________________________________________

## 2018-07-06 NOTE — ED TRIAGE NOTES
Pt arrives with stomach cramps and diffuse abd pain x2weeks, pt is also holding onto fluid despite his lasix. Last week pt was experiencing severe left sided abd pain, pt saw his doctor and they suggested that he be sent to the hospital for further eval and workup with CT imaging so he went to [facility] and was admitted Sunday, discharged Tuesday. Pt does have hx of stomach cancer but states he is in remission so when he saw his oncologist about this issue just a few days ago after being discharged, they recommended he see his PCP for this issue. Pt is back at the hospital now because the pain has not subsided. Pt has n/v/d, denies fever/chills.

## 2018-07-07 PROBLEM — Z99.89 OSA ON CPAP: Status: ACTIVE | Noted: 2018-07-07

## 2018-07-07 PROBLEM — G47.33 OSA ON CPAP: Status: ACTIVE | Noted: 2018-07-07

## 2018-07-07 PROBLEM — J44.9 COPD (CHRONIC OBSTRUCTIVE PULMONARY DISEASE) (HCC): Status: ACTIVE | Noted: 2018-07-07

## 2018-07-07 PROBLEM — E87.0 HYPERNATREMIA: Status: ACTIVE | Noted: 2018-07-07

## 2018-07-07 PROBLEM — Z95.1 HX OF CABG: Status: ACTIVE | Noted: 2018-07-07

## 2018-07-07 PROBLEM — C16.9 GASTRIC CANCER (HCC): Status: ACTIVE | Noted: 2018-07-07

## 2018-07-07 PROBLEM — I25.10 CAD (CORONARY ARTERY DISEASE): Status: ACTIVE | Noted: 2018-07-07

## 2018-07-07 PROBLEM — E88.09 HYPOALBUMINEMIA: Chronic | Status: ACTIVE | Noted: 2018-07-07

## 2018-07-07 LAB
ANION GAP SERPL CALC-SCNC: 9 MMOL/L (ref 7–16)
BASOPHILS # BLD: 0 K/UL (ref 0–0.2)
BASOPHILS NFR BLD: 0 % (ref 0–2)
BUN SERPL-MCNC: 8 MG/DL (ref 6–23)
CALCIUM SERPL-MCNC: 7.2 MG/DL (ref 8.3–10.4)
CHLORIDE SERPL-SCNC: 119 MMOL/L (ref 98–107)
CO2 SERPL-SCNC: 23 MMOL/L (ref 21–32)
CREAT SERPL-MCNC: 1.05 MG/DL (ref 0.8–1.5)
DIFFERENTIAL METHOD BLD: ABNORMAL
EOSINOPHIL # BLD: 0.1 K/UL (ref 0–0.8)
EOSINOPHIL NFR BLD: 1 % (ref 0.5–7.8)
ERYTHROCYTE [DISTWIDTH] IN BLOOD BY AUTOMATED COUNT: 17.4 % (ref 11.9–14.6)
GLUCOSE BLD STRIP.AUTO-MCNC: 101 MG/DL (ref 65–100)
GLUCOSE BLD STRIP.AUTO-MCNC: 120 MG/DL (ref 65–100)
GLUCOSE BLD STRIP.AUTO-MCNC: 84 MG/DL (ref 65–100)
GLUCOSE SERPL-MCNC: 85 MG/DL (ref 65–100)
HCT VFR BLD AUTO: 22.8 % (ref 41.1–50.3)
HGB BLD-MCNC: 7.7 G/DL (ref 13.6–17.2)
IMM GRANULOCYTES # BLD: 0 K/UL (ref 0–0.5)
IMM GRANULOCYTES NFR BLD AUTO: 0 % (ref 0–5)
LYMPHOCYTES # BLD: 0.8 K/UL (ref 0.5–4.6)
LYMPHOCYTES NFR BLD: 15 % (ref 13–44)
MAGNESIUM SERPL-MCNC: 2.1 MG/DL (ref 1.8–2.4)
MCH RBC QN AUTO: 35.3 PG (ref 26.1–32.9)
MCHC RBC AUTO-ENTMCNC: 33.8 G/DL (ref 31.4–35)
MCV RBC AUTO: 104.6 FL (ref 79.6–97.8)
MONOCYTES # BLD: 0.4 K/UL (ref 0.1–1.3)
MONOCYTES NFR BLD: 8 % (ref 4–12)
NEUTS SEG # BLD: 3.8 K/UL (ref 1.7–8.2)
NEUTS SEG NFR BLD: 76 % (ref 43–78)
PHOSPHATE SERPL-MCNC: 3.3 MG/DL (ref 2.5–4.5)
PLATELET # BLD AUTO: 114 K/UL (ref 150–450)
PMV BLD AUTO: 11.5 FL (ref 10.8–14.1)
POTASSIUM SERPL-SCNC: 4.3 MMOL/L (ref 3.5–5.1)
RBC # BLD AUTO: 2.18 M/UL (ref 4.23–5.67)
SODIUM SERPL-SCNC: 151 MMOL/L (ref 136–145)
WBC # BLD AUTO: 5.1 K/UL (ref 4.3–11.1)

## 2018-07-07 PROCEDURE — 87493 C DIFF AMPLIFIED PROBE: CPT | Performed by: INTERNAL MEDICINE

## 2018-07-07 PROCEDURE — 36430 TRANSFUSION BLD/BLD COMPNT: CPT | Performed by: NURSE PRACTITIONER

## 2018-07-07 PROCEDURE — 83735 ASSAY OF MAGNESIUM: CPT | Performed by: INTERNAL MEDICINE

## 2018-07-07 PROCEDURE — 94760 N-INVAS EAR/PLS OXIMETRY 1: CPT

## 2018-07-07 PROCEDURE — 77030020263 HC SOL INJ SOD CL0.9% LFCR 1000ML

## 2018-07-07 PROCEDURE — 82962 GLUCOSE BLOOD TEST: CPT

## 2018-07-07 PROCEDURE — 74011250636 HC RX REV CODE- 250/636: Performed by: FAMILY MEDICINE

## 2018-07-07 PROCEDURE — 87045 FECES CULTURE AEROBIC BACT: CPT | Performed by: INTERNAL MEDICINE

## 2018-07-07 PROCEDURE — 86900 BLOOD TYPING SEROLOGIC ABO: CPT | Performed by: FAMILY MEDICINE

## 2018-07-07 PROCEDURE — 74011250637 HC RX REV CODE- 250/637: Performed by: INTERNAL MEDICINE

## 2018-07-07 PROCEDURE — 65270000029 HC RM PRIVATE

## 2018-07-07 PROCEDURE — 74011000302 HC RX REV CODE- 302: Performed by: INTERNAL MEDICINE

## 2018-07-07 PROCEDURE — 77030039270 HC TU BLD FLTR CARD -A

## 2018-07-07 PROCEDURE — 82272 OCCULT BLD FECES 1-3 TESTS: CPT | Performed by: INTERNAL MEDICINE

## 2018-07-07 PROCEDURE — 84100 ASSAY OF PHOSPHORUS: CPT | Performed by: INTERNAL MEDICINE

## 2018-07-07 PROCEDURE — 94640 AIRWAY INHALATION TREATMENT: CPT

## 2018-07-07 PROCEDURE — P9016 RBC LEUKOCYTES REDUCED: HCPCS | Performed by: FAMILY MEDICINE

## 2018-07-07 PROCEDURE — 74011250636 HC RX REV CODE- 250/636: Performed by: INTERNAL MEDICINE

## 2018-07-07 PROCEDURE — 74011000250 HC RX REV CODE- 250: Performed by: FAMILY MEDICINE

## 2018-07-07 PROCEDURE — 77030020250 HC SOL INJ D 5% LFCR 1000ML BG LF

## 2018-07-07 PROCEDURE — 86580 TB INTRADERMAL TEST: CPT | Performed by: INTERNAL MEDICINE

## 2018-07-07 PROCEDURE — 85025 COMPLETE CBC W/AUTO DIFF WBC: CPT | Performed by: FAMILY MEDICINE

## 2018-07-07 PROCEDURE — 86923 COMPATIBILITY TEST ELECTRIC: CPT | Performed by: FAMILY MEDICINE

## 2018-07-07 PROCEDURE — 74011250637 HC RX REV CODE- 250/637: Performed by: FAMILY MEDICINE

## 2018-07-07 PROCEDURE — 77030020245 HC SOL INJ 5% D/0.9%NACL

## 2018-07-07 PROCEDURE — 80048 BASIC METABOLIC PNL TOTAL CA: CPT | Performed by: FAMILY MEDICINE

## 2018-07-07 PROCEDURE — 36415 COLL VENOUS BLD VENIPUNCTURE: CPT | Performed by: FAMILY MEDICINE

## 2018-07-07 PROCEDURE — 74011000250 HC RX REV CODE- 250: Performed by: INTERNAL MEDICINE

## 2018-07-07 RX ORDER — HYDROCODONE BITARTRATE AND ACETAMINOPHEN 10; 325 MG/1; MG/1
1 TABLET ORAL
Status: DISCONTINUED | OUTPATIENT
Start: 2018-07-07 | End: 2018-07-07

## 2018-07-07 RX ORDER — NITROGLYCERIN 400 UG/1
1 SPRAY ORAL
Status: DISCONTINUED | OUTPATIENT
Start: 2018-07-07 | End: 2018-07-08

## 2018-07-07 RX ORDER — HYDROCODONE BITARTRATE AND ACETAMINOPHEN 10; 325 MG/1; MG/1
1 TABLET ORAL
Status: DISCONTINUED | OUTPATIENT
Start: 2018-07-07 | End: 2018-07-12 | Stop reason: HOSPADM

## 2018-07-07 RX ORDER — ALBUTEROL SULFATE 0.83 MG/ML
2.5 SOLUTION RESPIRATORY (INHALATION)
Status: DISCONTINUED | OUTPATIENT
Start: 2018-07-07 | End: 2018-07-12 | Stop reason: HOSPADM

## 2018-07-07 RX ORDER — SODIUM CHLORIDE 9 MG/ML
1000 INJECTION, SOLUTION INTRAVENOUS CONTINUOUS
Status: DISCONTINUED | OUTPATIENT
Start: 2018-07-07 | End: 2018-07-07

## 2018-07-07 RX ORDER — DIPHENHYDRAMINE HCL 25 MG
25 CAPSULE ORAL
Status: DISCONTINUED | OUTPATIENT
Start: 2018-07-07 | End: 2018-07-12 | Stop reason: HOSPADM

## 2018-07-07 RX ORDER — FOLIC ACID 1 MG/1
1 TABLET ORAL DAILY
Status: DISCONTINUED | OUTPATIENT
Start: 2018-07-07 | End: 2018-07-12 | Stop reason: HOSPADM

## 2018-07-07 RX ORDER — DEXTROSE MONOHYDRATE 50 MG/ML
75 INJECTION, SOLUTION INTRAVENOUS CONTINUOUS
Status: DISCONTINUED | OUTPATIENT
Start: 2018-07-07 | End: 2018-07-10

## 2018-07-07 RX ORDER — NALOXONE HYDROCHLORIDE 0.4 MG/ML
0.4 INJECTION, SOLUTION INTRAMUSCULAR; INTRAVENOUS; SUBCUTANEOUS AS NEEDED
Status: DISCONTINUED | OUTPATIENT
Start: 2018-07-07 | End: 2018-07-12 | Stop reason: HOSPADM

## 2018-07-07 RX ORDER — LORAZEPAM 1 MG/1
1 TABLET ORAL
Status: DISCONTINUED | OUTPATIENT
Start: 2018-07-07 | End: 2018-07-07

## 2018-07-07 RX ORDER — HYDRALAZINE HYDROCHLORIDE 20 MG/ML
10 INJECTION INTRAMUSCULAR; INTRAVENOUS
Status: DISCONTINUED | OUTPATIENT
Start: 2018-07-07 | End: 2018-07-12 | Stop reason: HOSPADM

## 2018-07-07 RX ORDER — SODIUM CHLORIDE 9 MG/ML
250 INJECTION, SOLUTION INTRAVENOUS AS NEEDED
Status: DISCONTINUED | OUTPATIENT
Start: 2018-07-07 | End: 2018-07-12 | Stop reason: HOSPADM

## 2018-07-07 RX ORDER — BISACODYL 5 MG
5 TABLET, DELAYED RELEASE (ENTERIC COATED) ORAL DAILY PRN
Status: DISCONTINUED | OUTPATIENT
Start: 2018-07-07 | End: 2018-07-07

## 2018-07-07 RX ORDER — IPRATROPIUM BROMIDE AND ALBUTEROL SULFATE 2.5; .5 MG/3ML; MG/3ML
3 SOLUTION RESPIRATORY (INHALATION)
Status: DISCONTINUED | OUTPATIENT
Start: 2018-07-07 | End: 2018-07-12 | Stop reason: HOSPADM

## 2018-07-07 RX ORDER — ACETAMINOPHEN 325 MG/1
650 TABLET ORAL
Status: DISCONTINUED | OUTPATIENT
Start: 2018-07-07 | End: 2018-07-12 | Stop reason: HOSPADM

## 2018-07-07 RX ORDER — HEPARIN SODIUM 5000 [USP'U]/ML
3000 INJECTION, SOLUTION INTRAVENOUS; SUBCUTANEOUS
Status: COMPLETED | OUTPATIENT
Start: 2018-07-07 | End: 2018-07-07

## 2018-07-07 RX ORDER — PROCHLORPERAZINE EDISYLATE 5 MG/ML
5 INJECTION INTRAMUSCULAR; INTRAVENOUS
Status: DISCONTINUED | OUTPATIENT
Start: 2018-07-07 | End: 2018-07-12 | Stop reason: HOSPADM

## 2018-07-07 RX ORDER — METOPROLOL TARTRATE 50 MG/1
50 TABLET ORAL EVERY 12 HOURS
Status: DISCONTINUED | OUTPATIENT
Start: 2018-07-07 | End: 2018-07-12 | Stop reason: HOSPADM

## 2018-07-07 RX ORDER — SODIUM CHLORIDE 0.9 % (FLUSH) 0.9 %
5-10 SYRINGE (ML) INJECTION AS NEEDED
Status: DISCONTINUED | OUTPATIENT
Start: 2018-07-07 | End: 2018-07-12 | Stop reason: HOSPADM

## 2018-07-07 RX ORDER — MORPHINE SULFATE 15 MG/1
15 TABLET ORAL
Status: DISCONTINUED | OUTPATIENT
Start: 2018-07-07 | End: 2018-07-07

## 2018-07-07 RX ORDER — CLONAZEPAM 0.5 MG/1
0.5 TABLET ORAL
Status: DISCONTINUED | OUTPATIENT
Start: 2018-07-07 | End: 2018-07-12 | Stop reason: HOSPADM

## 2018-07-07 RX ORDER — BUDESONIDE 0.5 MG/2ML
500 INHALANT ORAL
Status: DISCONTINUED | OUTPATIENT
Start: 2018-07-07 | End: 2018-07-12 | Stop reason: HOSPADM

## 2018-07-07 RX ORDER — METRONIDAZOLE 500 MG/100ML
500 INJECTION, SOLUTION INTRAVENOUS EVERY 8 HOURS
Status: DISCONTINUED | OUTPATIENT
Start: 2018-07-07 | End: 2018-07-08

## 2018-07-07 RX ORDER — FUROSEMIDE 40 MG/1
40 TABLET ORAL DAILY
Status: DISCONTINUED | OUTPATIENT
Start: 2018-07-07 | End: 2018-07-12 | Stop reason: HOSPADM

## 2018-07-07 RX ORDER — FUROSEMIDE 10 MG/ML
20 INJECTION INTRAMUSCULAR; INTRAVENOUS ONCE
Status: COMPLETED | OUTPATIENT
Start: 2018-07-07 | End: 2018-07-07

## 2018-07-07 RX ORDER — SODIUM CHLORIDE 0.9 % (FLUSH) 0.9 %
5-10 SYRINGE (ML) INJECTION EVERY 8 HOURS
Status: DISCONTINUED | OUTPATIENT
Start: 2018-07-07 | End: 2018-07-12 | Stop reason: HOSPADM

## 2018-07-07 RX ORDER — CIPROFLOXACIN 2 MG/ML
400 INJECTION, SOLUTION INTRAVENOUS EVERY 12 HOURS
Status: DISCONTINUED | OUTPATIENT
Start: 2018-07-07 | End: 2018-07-08

## 2018-07-07 RX ORDER — INSULIN LISPRO 100 [IU]/ML
INJECTION, SOLUTION INTRAVENOUS; SUBCUTANEOUS
Status: DISCONTINUED | OUTPATIENT
Start: 2018-07-07 | End: 2018-07-12 | Stop reason: HOSPADM

## 2018-07-07 RX ORDER — GUAIFENESIN 600 MG/1
600 TABLET, EXTENDED RELEASE ORAL EVERY 12 HOURS
Status: DISCONTINUED | OUTPATIENT
Start: 2018-07-07 | End: 2018-07-12 | Stop reason: HOSPADM

## 2018-07-07 RX ADMIN — CIPROFLOXACIN 400 MG: 2 INJECTION, SOLUTION INTRAVENOUS at 05:15

## 2018-07-07 RX ADMIN — IPRATROPIUM BROMIDE AND ALBUTEROL SULFATE 3 ML: .5; 3 SOLUTION RESPIRATORY (INHALATION) at 20:23

## 2018-07-07 RX ADMIN — Medication 10 ML: at 23:32

## 2018-07-07 RX ADMIN — SODIUM CHLORIDE 1000 ML: 900 INJECTION, SOLUTION INTRAVENOUS at 05:10

## 2018-07-07 RX ADMIN — FAMOTIDINE 20 MG: 10 INJECTION, SOLUTION INTRAVENOUS at 20:28

## 2018-07-07 RX ADMIN — GUAIFENESIN 600 MG: 600 TABLET, EXTENDED RELEASE ORAL at 14:18

## 2018-07-07 RX ADMIN — FOLIC ACID 1 MG: 1 TABLET ORAL at 17:40

## 2018-07-07 RX ADMIN — FAMOTIDINE 20 MG: 10 INJECTION, SOLUTION INTRAVENOUS at 10:06

## 2018-07-07 RX ADMIN — METRONIDAZOLE 500 MG: 500 INJECTION, SOLUTION INTRAVENOUS at 22:02

## 2018-07-07 RX ADMIN — TUBERCULIN PURIFIED PROTEIN DERIVATIVE 5 UNITS: 5 INJECTION, SOLUTION INTRADERMAL at 17:32

## 2018-07-07 RX ADMIN — MORPHINE SULFATE 15 MG: 15 TABLET ORAL at 08:12

## 2018-07-07 RX ADMIN — GUAIFENESIN 600 MG: 600 TABLET, EXTENDED RELEASE ORAL at 20:30

## 2018-07-07 RX ADMIN — HYDROCODONE BITARTRATE AND ACETAMINOPHEN 1 TABLET: 10; 325 TABLET ORAL at 17:41

## 2018-07-07 RX ADMIN — FUROSEMIDE 20 MG: 10 INJECTION, SOLUTION INTRAMUSCULAR; INTRAVENOUS at 17:31

## 2018-07-07 RX ADMIN — CIPROFLOXACIN 400 MG: 2 INJECTION, SOLUTION INTRAVENOUS at 08:12

## 2018-07-07 RX ADMIN — IPRATROPIUM BROMIDE AND ALBUTEROL SULFATE 3 ML: .5; 3 SOLUTION RESPIRATORY (INHALATION) at 11:21

## 2018-07-07 RX ADMIN — BUDESONIDE 500 MCG: 0.5 INHALANT RESPIRATORY (INHALATION) at 20:23

## 2018-07-07 RX ADMIN — PROCHLORPERAZINE EDISYLATE 5 MG: 5 INJECTION INTRAMUSCULAR; INTRAVENOUS at 10:06

## 2018-07-07 RX ADMIN — METRONIDAZOLE 500 MG: 500 INJECTION, SOLUTION INTRAVENOUS at 05:15

## 2018-07-07 RX ADMIN — METOPROLOL TARTRATE 50 MG: 50 TABLET ORAL at 10:05

## 2018-07-07 RX ADMIN — METOPROLOL TARTRATE 50 MG: 50 TABLET ORAL at 20:30

## 2018-07-07 RX ADMIN — Medication 10 ML: at 14:00

## 2018-07-07 RX ADMIN — HEPARIN SODIUM 3000 UNITS: 5000 INJECTION, SOLUTION INTRAVENOUS; SUBCUTANEOUS at 00:36

## 2018-07-07 RX ADMIN — METRONIDAZOLE 500 MG: 500 INJECTION, SOLUTION INTRAVENOUS at 17:32

## 2018-07-07 RX ADMIN — Medication 10 ML: at 05:17

## 2018-07-07 RX ADMIN — CIPROFLOXACIN 400 MG: 2 INJECTION, SOLUTION INTRAVENOUS at 20:28

## 2018-07-07 RX ADMIN — DEXTROSE MONOHYDRATE 75 ML/HR: 5 INJECTION, SOLUTION INTRAVENOUS at 09:00

## 2018-07-07 RX ADMIN — FUROSEMIDE 40 MG: 40 TABLET ORAL at 10:05

## 2018-07-07 RX ADMIN — IPRATROPIUM BROMIDE AND ALBUTEROL SULFATE 3 ML: .5; 3 SOLUTION RESPIRATORY (INHALATION) at 14:57

## 2018-07-07 NOTE — PROGRESS NOTES
CAll received from lab regarding patient blood type is AB and lab does not have any on hand and can they use A or B.  Call placed to on call MD.

## 2018-07-07 NOTE — PROGRESS NOTES
07/07/18 0144 Dual Skin Pressure Injury Assessment Dual Skin Pressure Injury Assessment WDL Second Care Provider (Based on [address] [place] [address]) Florentin Su Skin Integumentary Skin Integumentary (WDL) X Pressure  Injury Documentation No Pressure Injury Noted-Pressure Ulcer Prevention Initiated Skin Color Appropriate for ethnicity Skin Condition/Temp Dry; Warm  
Skin Integrity Scars (comment); Tattoos (comment); Other (comment) (scabs on bilateral legs. old scar to left inner leg) Turgor Shiney/hard Hair Growth Present Varicosities Absent

## 2018-07-07 NOTE — H&P
HOSPITALIST H&P/CONSULT 
NAME:  Donell Cruz Age:  62 y.o. 
:   1960 MRN:   [id number] PCP: Azalia Patel MD 
Treatment Team: Attending Provider: Jaleel Shell DO; Primary Nurse: Virginia Ford RN No Order CC: Reason for admission is: pancolitis HPI:  
Patient history was obtained from the ER provider prior to seeing the patient. Patient is a 62 y.o. male who presents to the ER due to persistent abdominal pain with n/v. These symptoms originally started about 2 weeks ago and at that time, he also had watery diarrhea. He was eventually seen at [facility] and diagnosed with pancolitis and placed on Cipro and Flagyl. He then started having n/v and unable to tolerate the meds. His pain is worst at LLQ and achy with some cramps as well. His course has been somewhat complicated by a recent PE as well. He is on Xarelto and fortunately has not noted any blood in his stool or melena. He is also s/p gastrectomy due to gastric cancer and did see his oncologist earlier today. He is ok from oncology stand point but they did note his anemia and recommend GI follow up. ROS: 
All systems have been reviewed and are negative except as stated in HPI or elsewhere. Past Medical History:  
Diagnosis Date  Bipolar 1 disorder ([address]  CAD (coronary artery disease)  Cancer ([address] stomach  Chronic obstructive pulmonary disease [facility]  Gastrointestinal disorder   
 stomach cancer  Hypertension  Psychiatric disorder  Schizoaffective disorder ([facility]  Seizures ([facility] Past Surgical History:  
Procedure Laterality Date  ABDOMEN SURGERY PROC UNLISTED    
 stomach removed per pt  CARDIAC SURG PROCEDURE UNLIST CABG  
 HX ORTHOPAEDIC    
 right knee replacement Social History Substance Use Topics  Smoking status: Former Smoker  Smokeless tobacco: Not on file Comment: quit 30 years ago  Alcohol use No  
  
No family history on file.  Reviewed and non-contributory to admitting diagnosis Allergies Allergen Reactions  Abilify [Aripiprazole] Unknown (comments)  Contrast Dye [Iodine] Hives and Nausea and Vomiting Reports he has be premedicated  Penicillins Unknown (comments)  Wellbutrin [Bupropion] Other (comments) \"locking up\" Prior to Admission Medications Prescriptions Last Dose Informant Patient Reported? Taking?  
oxyCODONE-acetaminophen (PERCOCET)  mg per tablet   No No  
Sig: Take 1 Tab by mouth every six (6) hours as needed for Pain. Max Daily Amount: 4 Tabs. Facility-Administered Medications: None Objective:  
Patient Vitals for the past 24 hrs: 
 Temp Pulse Resp BP SpO2  
18 - - - 158/74 93 % 18 - (!) 54 - - 94 % 18 - (!) 52 - - 96 % 18 - 72 - 138/71 93 % 18 - 64 - 132/73 95 % 18 - (!) 50 - 144/70 95 % 18 - (!) 46 - 144/78 97 % 18 - 67 - 141/76 92 % 18 - 62 - 144/71 92 % 18 - (!) 54 - 137/68 96 % 18 - (!) 56 - - 96 % 18 - (!) 57 - 133/77 97 % 18 1844 98.1 °F (36.7 °C) 89 16 147/85 96 % No intake or output data in the 24 hours ending 18 2321 Temp (24hrs), Av.1 °F (36.7 °C), Min:98.1 °F (36.7 °C), Max:98.1 °F (36.7 °C) Oxygen Therapy O2 Sat (%): 93 % (18) Pulse via Oximetry: 55 beats per minute (18) O2 Device: Room air (18) Body mass index is 25.39 kg/(m^2). Physical Exam: 
 
General:    WD and WN, No apparent distress. Ill appearing Head:   Normocephalic, without obvious abnormality, atraumatic. Eyes:  PERRL; EOMI; sclera normal/non-icteric ENT:  Hearing is normal.  Oropharynx is clear with tacky mucous membranes Resp:    Clear to auscultation bilaterally. No Wheezing or Rhonchi. Resp are even and unlabored Heart[de-identified]  Regular rate and rhythm, no murmur,   No LE edema Abdomen:   Soft, LUQ and LLQ-tender. Not distended. Bowel sounds normal.  hepato-splenomegaly -none Musc/SK: Muscle strength is good and tone normal; No cyanosis. No clubbing Skin:     Texture, turgor normal. No significant rashes or lesions. Neurologic: CN II - XII are grossly intact - other than Eye exam as noted above Psych: Alert and oriented x 4;  Judgement and insight are normal 
  
Data Review:  
Recent Results (from the past 24 hour(s)) CBC WITH AUTOMATED DIFF Collection Time: 07/06/18  7:46 PM  
Result Value Ref Range WBC 5.1 4.3 - 11.1 K/uL  
 RBC 1.97 (L) 4.23 - 5.67 M/uL HGB 7.1 (L) 13.6 - 17.2 g/dL HCT 20.7 (LL) 41.1 - 50.3 % .1 (H) 79.6 - 97.8 FL  
 MCH 36.0 (H) 26.1 - 32.9 PG  
 MCHC 34.3 31.4 - 35.0 g/dL  
 RDW 17.4 (H) 11.9 - 14.6 % PLATELET 023 (L) 250 - 450 K/uL MPV 10.6 (L) 10.8 - 14.1 FL  
 DF AUTOMATED NEUTROPHILS 72 43 - 78 % LYMPHOCYTES 18 13 - 44 % MONOCYTES 9 4.0 - 12.0 % EOSINOPHILS 1 0.5 - 7.8 % BASOPHILS 0 0.0 - 2.0 % IMMATURE GRANULOCYTES 0 0.0 - 5.0 %  
 ABS. NEUTROPHILS 3.6 1.7 - 8.2 K/UL  
 ABS. LYMPHOCYTES 0.9 0.5 - 4.6 K/UL  
 ABS. MONOCYTES 0.5 0.1 - 1.3 K/UL  
 ABS. EOSINOPHILS 0.1 0.0 - 0.8 K/UL  
 ABS. BASOPHILS 0.0 0.0 - 0.2 K/UL  
 ABS. IMM. GRANS. 0.0 0.0 - 0.5 K/UL METABOLIC PANEL, COMPREHENSIVE Collection Time: 07/06/18  7:46 PM  
Result Value Ref Range Sodium 151 (H) 136 - 145 mmol/L Potassium 3.9 3.5 - 5.1 mmol/L Chloride 121 (H) 98 - 107 mmol/L  
 CO2 24 21 - 32 mmol/L Anion gap 6 (L) 7 - 16 mmol/L Glucose 72 65 - 100 mg/dL BUN 7 6 - 23 MG/DL Creatinine 1.07 0.8 - 1.5 MG/DL  
 GFR est AA >60 >60 ml/min/1.73m2 GFR est non-AA >60 >60 ml/min/1.73m2 Calcium 7.1 (L) 8.3 - 10.4 MG/DL Bilirubin, total 0.2 0.2 - 1.1 MG/DL  
 ALT (SGPT) 37 12 - 65 U/L  
 AST (SGOT) 28 15 - 37 U/L Alk. phosphatase 89 50 - 136 U/L Protein, total 4.2 (L) 6.3 - 8.2 g/dL Albumin 1.9 (L) 3.5 - 5.0 g/dL Globulin 2.3 2.3 - 3.5 g/dL A-G Ratio 0.8 (L) 1.2 - 3.5 LIPASE Collection Time: 07/06/18  7:46 PM  
Result Value Ref Range Lipase 29 (L) 73 - 393 U/L  
BNP Collection Time: 07/06/18  7:46 PM  
Result Value Ref Range  pg/mL C REACTIVE PROTEIN, QT Collection Time: 07/06/18  7:46 PM  
Result Value Ref Range C-Reactive protein <0.3 0.0 - 0.9 mg/dL POC LACTIC ACID Collection Time: 07/06/18  7:53 PM  
Result Value Ref Range Lactic Acid (POC) 2.0 (H) 0.5 - 1.9 mmol/L  
EKG, 12 LEAD, INITIAL Collection Time: 07/06/18  9:03 PM  
Result Value Ref Range Ventricular Rate 47 BPM  
 Atrial Rate 47 BPM  
 P-R Interval 144 ms QRS Duration 78 ms Q-T Interval 492 ms QTC Calculation (Bezet) 435 ms Calculated P Axis 118 degrees Calculated R Axis 26 degrees Calculated T Axis -44 degrees Diagnosis    
  !! AGE AND GENDER SPECIFIC ECG ANALYSIS !! Marked sinus bradycardia ST & T wave abnormality, consider inferior ischemia ST & T wave abnormality, consider anterolateral ischemia Abnormal ECG No previous ECGs available CXR Results  (Last 48 hours) 07/06/18 2241  XR CHEST PA LAT Final result Impression:  IMPRESSION: Small area of atelectasis or infiltrate left lower lobe Narrative:  Chest 2 view dated 7/6/2018 Prior exam 11/24/2017 Confirmation: Abnormal breath sounds There is a right-sided navya. Metallic sternal wires are present. Heart is  
normal in size and mediastinum unremarkable. Vascularity is normal. There is  
hazy density at the left base consistent atelectasis or infiltrate. There is no  
pleural effusion. CT Results  (Last 48 hours) None Assessment and Plan: Active Hospital Problems Diagnosis Date Noted  Pancolitis ([address] 07/06/2018  N&V (nausea and vomiting) 07/06/2018  Essential hypertension 07/06/2018  Panlobular emphysema ([facility] 07/06/2018  Anemia 07/06/2018  Chronic pulmonary embolism ([facility] 07/06/2018 Dx early June 2018; on Xarelto Principal Problem: 
  Pancolitis ([facility] (7/6/2018) IV cipro & flagyl Active Problems: 
  N&V (nausea and vomiting) (7/6/2018) Anti emetics Essential hypertension (7/6/2018) Home meds Panlobular emphysema ([facility] (7/6/2018) Home meds; HHN Anemia (7/6/2018) Transfuse 2 units Chronic pulmonary embolism ([facility] (7/6/2018) Continue Xarelto · PLAN  
· Cont appropriate home meds (see MAR) · Control symptoms (pain, n/v, fever, etc) · Monitor appropriate labs · DVT prophylaxis:  Xarelto · Code status: Full · Risk: mod · Anticipated DC needs: · Estimated LOS:  Greater than 2 midnights · Plans discussed with patient and/or caregiver; questions answered. Med records reviewed if applicable; findings:  
 
Critical care time if applicable:   
 
Signed By: Ronen White MD   
 July 6, 2018

## 2018-07-07 NOTE — PROGRESS NOTES
Uneventful shift. Hourly rounds completed throughout shift. No complaint of pain. Patient denies needs at this time. Will continue to monitor and give bedside report to oncoming day shift nurse.

## 2018-07-07 NOTE — ED NOTES
Unable to draw blood off port for T&S despite multiple attempts and heparin flush. Receiving RN aware.

## 2018-07-07 NOTE — PROGRESS NOTES
Progress Note Patient: Donell Cruz MRN: [id number]  SSN: [id number] YOB: 1960  Age: 62 y.o. Sex: male Admit Date: 7/6/2018 LOS: 1 day Subjective:  
Cc: \" I feel nauseous\" Patient examined at bedside. He was found alert, able to speak in full sentences. He stated feeling with nausea, was able to tolerate his breakfast without vomiting. He was admitted overnight for colitis and persisting vomiting and diarrhea. Currently on iv cipro and flagyl. On further questioning, he stated having vomiting, nausea, abdominal pain and diarrhea for around 2 weeks. He visited [facility] on 6/30/18 and after a CT scan with a diagnosis of pancolitis, he was DC on cipro and flagyl. Since then his symptoms are not any better. He experiences watery diarrhea > 10-25 times daily. I explained need to continue antibiotics for now. I'll check for C diff in this scenario. Also, he was found to have a diagnosis of DVT/PE on 5/31/18 and is on xarelto since then. ROS: denies fever, chills, chest pain, sob. Objective:  
 
Vitals:  
 07/06/18 2219 07/07/18 0003 07/07/18 0144 07/07/18 8250 BP: 158/74 155/84 161/72 159/77 Pulse:  (!) 56 (!) 57 95 Resp:   16 18 Temp:  97.6 °F (36.4 °C) 98 °F (36.7 °C) 98.4 °F (36.9 °C) SpO2: 93% 96% 90% 97% Weight:      
Height:      
  
 
Intake and Output: 
Current Shift:   
Last three shifts:   
 
Physical Exam:  
GENERAL: alert, cooperative, no distress, appears stated age EYE: negative LYMPHATIC: Cervical, supraclavicular, and axillary nodes normal.  
THROAT & NECK: dry oral mucous membranes, no exudates LUNG: bilateral air entry, he has rhonchi, minimal wheezing HEART: regular rate and rhythm, S1, S2 normal, no murmur, click, rub or gallop ABDOMEN: soft, non-tender. Bowel sounds decreased. No masses,  no organomegaly EXTREMITIES:  Bilateral pedal edema grade III, up to the thighs. Genitals: hydrocele SKIN: Normal. 
NEUROLOGIC: negative PSYCHIATRIC: non focal 
 
Lab/Data Review: All lab results for the last 24 hours reviewed. Assessment:  
 
Principal Problem: 
  Pancolitis ([facility] (7/6/2018) Active Problems: 
  N&V (nausea and vomiting) (7/6/2018) Essential hypertension (7/6/2018) Panlobular emphysema ([address] (7/6/2018) Anemia (7/6/2018) Chronic pulmonary embolism ([facility] (7/6/2018) Overview: Dx early June 2018; on Xarelto Hypoalbuminemia (7/7/2018) Overview: Probably due to malnutrition Plan:  
-Subacute diarrhea, nausea and vomiting, in the setting on pancolitis on an abdomen CT scan from 6/30/18: he has watery diarrhea, so C diff has to be ruled out - continue IVF hydration - cipro and flagyl for now, zofran- monitor chemistry daily - check blood cultures -Hypernatremia: possible due to dehydration /GI losses: start D5 gtt - monitor chemistry  
  
-History of chronic anemia: no hx of overt GI bleeding at the moment: HB 7gr, in view of his cardiac history ( CAD, CABG ), he will get transfused 2 prbc- give lasix in between transfusions - monitor HH 
 
-History of DVT/PE: on xarelto, but currently held in view of anemia, and anticipating possible colonoscopy if his clinical status does not improve 
 
-CAD, CABG: no chest pain, normal EF > 55% on recent echo, plavix held in light of anemia and anticipating possible colonoscopy - continue NTG, home meds 
 
-COPD, mild exacerbation: duonebs - home prn - pulmicort - mucinex 
 
-Essential hypertension: resume lopressor and lasix for now, hold lisinopril  
  
-SHANNON, on cpap -Hypoalbuminemia, caloric malnutrition / pedal edema: on lasix - nutrition consult 
 
-History of falls and debility: PT/OT- patient uses wheelchair at home due to this findings/ place PPD  
 
DVT ppx: ambulation Full code Disposition: home with home PT versus STR Signed By: Mary Rachel MD   
 July 7, 2018

## 2018-07-07 NOTE — PROGRESS NOTES
TRANSFER - IN REPORT: 
 
Verbal report received from DEBORAH Jacinto(name) on [facility]  being received from ED(unit) for routine progression of care Report consisted of patients Situation, Background, Assessment and  
Recommendations(SBAR). Information from the following report(s) SBAR was reviewed with the receiving nurse. Opportunity for questions and clarification was provided. Assessment completed upon patients arrival to unit and care assumed.

## 2018-07-07 NOTE — PROGRESS NOTES
Problem: Nutrition Deficit Goal: *Optimize nutritional status Nutrition Reason for assessment: Referral received from nursing admission Malnutrition Screening Tool for recently lost unsure amount of weight loss without trying and eating poorly due to decreased appetite. Consult received for \"Has hypoalbuminemia; is s/p gastrectomy from gastric cancer; now with pancolitis\" (Dr. Tova Richardson) Assessment:  
Diet order(s): Regular with ensure enlive TID, changed to CLQ at 8:30 this morning Food/Nutrition Patient History:  Pt with h/o gastric cancer (signet ring carcinoma), diagnosed in 3/2016, s/p total gastrectomy and omentectomy with Germaine-en-Y following XRT (sx on 11/2/16). Pt receiving NGT feedings ~5 days post-op and has been on a \"GI soft diet\" (regular diet per pt) since. Pt reports that he has chronic diarrhea (reports wearing depends). Per pt, he has been told to eat small, frequent meals, which he attempts to do. Pt reports on \"good days\" he can eat anything (reports eating meats, vegetables, etc.). However, over the past 3 weeks pt has had severe n/v and diarrhea-note diagnosis of pancolitis. He drinks one ensure/day and consumes mostly soups (mostly broccoli cheddar). He reports a weight of ~230 pounds prior to CA diagnosis and states that he got down to a weight of 94 pounds following gastrectomy. He has chronic lower extremity edema. Pt gets B12 injections every 30 days. He denies taking any form of MVI and/or enzymes. Pt expresses concerns over loose, fatty/greasy, krystian colored stools c/w fat malabsorption. Pt should be checked for fat soluble vitamin deficiencies and potentially started on a pancreatic enzyme. Note newly found anemia-recommend checking for iron and folate. Pt states that diarrhea is severe after consumption of sweet tea. Pt states \"i am a sweet tea addict. \"  He has been educated on dumping syndrome and states that he does not drink liquids with meals (typicallly waits ~30-45 minutes after a meal to drink) but he was not educated on effects of sugar on dumping syndrome. I observed Leandras bag at bedside and pt states he ate ~1/2 of a Big Mac last evening without difficulty. He had nausea after regular breakfast this morning (hashbrowns and muffin) as pt was not placed on clear liquids until 8:30 am.  Noted additional h/o bipolar disorder and schizophrenia, COPD, prior CABG. Anthropometrics:Height: 5' 8\" (172.7 cm),  Weight: 75.8 kg (167 lb),  , Body mass index is 25.39 kg/(m^2). BMI class of overweight for age >65 years. Edema: 2-3+ pitting to BLEs WT / BMI 7/6/2018 11/24/2017 4/8/2017 WEIGHT 167 lb 148 lb 130 lb No previous weights prior to 4/2017 in EMR. Weight gain potentially c/w fluid retention. Macronutrient needs: EER:  3973-9280 kcal /day (25-30 kcal/kg I BW)-pt currently edematous EPR:  70-91 grams protein/day (1-1.3 grams/kg IBW)(GFR >60) Intake/Comparative Standards: Per RD meal rounds: 50% of breakfast. No recorded meal intakes. Nutrition Diagnosis: Inadequate oral intake r/t altered GI function as evidenced by pt with h/o gastrectomy, now with colitis, pt report of n/v/d for ~3 weeks PTA, on a CLQ diet which is inadequate in calories/protein. Intervention: 
Meals and snacks: CLQ Nutrition Supplement Therapy: would not recommend ensure clear with pt at risk for dumping syndrome. Will start ensure high protein with FLQ/Solid diet. Recommend checking for fat soluble vitamin deficiencies and potentially adding pancreatic enzyme. Pt should be checked for iron and folate deficiencies. If bowel rest is the intervention planned for pancolitis, pt may benefit from IV nutrition if diet is not expected to advance beyond CLQ within the next 7-10 days. Discharge nutrition plan: Too soon to determine. [address], [address], [address] [place] [address], [phone]

## 2018-07-07 NOTE — ED NOTES
TRANSFER - OUT REPORT: 
 
Verbal report given to DEBORAH Gordon(name) on Austin Jolley  being transferred to [facility](unit) for routine progression of care Report consisted of patients Situation, Background, Assessment and  
Recommendations(SBAR). Information from the following report(s) SBAR, ED Summary and Recent Results was reviewed with the receiving nurse. Lines:  
Venous Access Device Upper chest (subclavicular area, right (Active) Opportunity for questions and clarification was provided.

## 2018-07-07 NOTE — PROGRESS NOTES
Return call from Dr Tova Richardson. MD request that the lab use the AB combination. Lab notified of this. Will notify the on coming nurse for the next shift.

## 2018-07-07 NOTE — PROGRESS NOTES
Pt. Ate chicken noodle soup brought by family. Explained to pt. That he was on a clear liquid diet, but pt. Insists the MD said he could have chicken noodle soup.  Will notify MD.

## 2018-07-07 NOTE — ED PROVIDER NOTES
HPI Comments: Patient presents to the ground due to complaints of persistent abdominal pain as well as nausea and vomiting. He has been seen over at [facility] worries followed by oncology as well as internal medicine and was recently evaluated for abdominal pain that has been going on for about 2 weeks associated with loose watery stools. He was diagnosed with pancolitis and started on Cipro and Flagyl but states he is unable to tolerate oral medications as he vomits them up. He is status post gastrectomy for gastric cancer. Any fever or chills but also reports recurring episodes of syncope that seem to be related with worsening of his abdominal pain. He was seen by his oncologist today and recommended follow-up with gastroenterology due to anemia and patient is also on Zaroxolyn at this time with history of PE in the bilateral lower extremity DVT. He has been on Zaroxolyn for approximately 3 weeks now he denies any blood in the stool or black tarry stools. Patient is a 62 y.o. male presenting with abdominal pain. The history is provided by the patient. Abdominal Pain    This is a chronic problem. The current episode started more than 1 week ago. The problem occurs constantly. The problem has not changed since onset. The pain is associated with vomiting. The pain is located in the LLQ and LUQ. The quality of the pain is cramping and sharp. The pain is at a severity of 7/10. The pain is moderate. Associated symptoms include diarrhea, nausea and vomiting. Pertinent negatives include no anorexia, no fever, no belching, no flatus, no hematochezia, no melena, no constipation, no dysuria, no frequency, no hematuria, no arthralgias, no myalgias, no chest pain and no back pain. Nothing worsens the pain. The pain is relieved by nothing. Past workup includes CT scan. Past workup comments: echocardiagram. The patient's surgical history non-contributory.        Past Medical History:   Diagnosis Date    Bipolar 1 disorder ([facility]     CAD (coronary artery disease)     Cancer (HCC)     stomach    Chronic obstructive pulmonary disease (HCC)     Gastrointestinal disorder     stomach cancer    Hypertension     Psychiatric disorder     [facility]     Seizures ([facility]        Past Surgical History:   Procedure Laterality Date    ABDOMEN SURGERY PROC UNLISTED      stomach removed per pt    CARDIAC SURG PROCEDURE UNLIST      CABG    HX ORTHOPAEDIC      right knee replacement         No family history on file. Social History     Social History    Marital status:      Spouse name: N/A    Number of children: N/A    Years of education: N/A     Occupational History    Not on file. Social History Main Topics    Smoking status: Former Smoker    Smokeless tobacco: Not on file      Comment: quit 30 years ago    Alcohol use No    Drug use: No    Sexual activity: Not on file     Other Topics Concern    Not on file     Social History Narrative    No narrative on file         ALLERGIES: Abilify [aripiprazole]; Contrast dye [iodine]; Penicillins; and Wellbutrin [bupropion]    Review of Systems   Constitutional: Negative for fever. Cardiovascular: Negative for chest pain. Gastrointestinal: Positive for abdominal pain, diarrhea, nausea and vomiting. Negative for anorexia, constipation, flatus, hematochezia and melena. Genitourinary: Negative for dysuria, frequency and hematuria. Musculoskeletal: Negative for arthralgias, back pain and myalgias. All other systems reviewed and are negative. Vitals:    07/06/18 1844 07/06/18 1930 07/06/18 1946   BP: 147/85 133/77    Pulse: 89 (!) 57 (!) 56   Resp: 16     Temp: 98.1 °F (36.7 °C)     SpO2: 96% 97% 96%   Weight: 75.8 kg (167 lb)     Height: 5' 8\" (1.727 m)              Physical Exam   Constitutional: He is oriented to person, place, and time. He appears well-developed and well-nourished. No distress. HENT:   Head: Normocephalic and atraumatic. Eyes: Conjunctivae and EOM are normal. Pupils are equal, round, and reactive to light. Neck: Normal range of motion. Neck supple. Cardiovascular: Normal rate, regular rhythm and normal heart sounds. Pulmonary/Chest: Effort normal.   Rhonchi noted in the right midlung field   Abdominal: Soft. Bowel sounds are normal. There is tenderness. Left-sided abdominal tenderness is noted bowel sounds are active. Musculoskeletal: Normal range of motion. He exhibits edema. He exhibits no tenderness or deformity. Neurological: He is alert and oriented to person, place, and time. Skin: Skin is warm and dry. Psychiatric: He has a normal mood and affect. His behavior is normal.   Nursing note and vitals reviewed. MDM  Number of Diagnoses or Management Options     Amount and/or Complexity of Data Reviewed  Clinical lab tests: ordered and reviewed  Tests in the radiology section of CPT®: ordered and reviewed  Independent visualization of images, tracings, or specimens: yes    Risk of Complications, Morbidity, and/or Mortality  Presenting problems: moderate  Diagnostic procedures: moderate  Management options: moderate    Patient Progress  Patient progress: stable        ED Course       Procedures      EKG at its  9:03 PM: Marked sinus bradycardia with a rate of 47  With inferior lateral T-wave inversions that appear to be noted on EKG from 3 days ago at [facility].    No acute ischemic changes are noted

## 2018-07-08 LAB
ANION GAP SERPL CALC-SCNC: 9 MMOL/L (ref 7–16)
ATRIAL RATE: 47 BPM
BACTERIA SPEC CULT: NEGATIVE
BACTERIA SPEC CULT: NORMAL
BASOPHILS # BLD: 0 K/UL (ref 0–0.2)
BASOPHILS NFR BLD: 0 % (ref 0–2)
BUN SERPL-MCNC: 6 MG/DL (ref 6–23)
CALCIUM SERPL-MCNC: 7.3 MG/DL (ref 8.3–10.4)
CALCULATED P AXIS, ECG09: 118 DEGREES
CALCULATED R AXIS, ECG10: 26 DEGREES
CALCULATED T AXIS, ECG11: -44 DEGREES
CHLORIDE SERPL-SCNC: 111 MMOL/L (ref 98–107)
CO2 SERPL-SCNC: 27 MMOL/L (ref 21–32)
CREAT SERPL-MCNC: 1.06 MG/DL (ref 0.8–1.5)
DIAGNOSIS, 93000: NORMAL
DIFFERENTIAL METHOD BLD: ABNORMAL
EOSINOPHIL # BLD: 0 K/UL (ref 0–0.8)
EOSINOPHIL NFR BLD: 1 % (ref 0.5–7.8)
ERYTHROCYTE [DISTWIDTH] IN BLOOD BY AUTOMATED COUNT: 19.8 % (ref 11.9–14.6)
GLUCOSE BLD STRIP.AUTO-MCNC: 103 MG/DL (ref 65–100)
GLUCOSE BLD STRIP.AUTO-MCNC: 104 MG/DL (ref 65–100)
GLUCOSE BLD STRIP.AUTO-MCNC: 120 MG/DL (ref 65–100)
GLUCOSE BLD STRIP.AUTO-MCNC: 99 MG/DL (ref 65–100)
GLUCOSE SERPL-MCNC: 69 MG/DL (ref 65–100)
HCT VFR BLD AUTO: 32.2 % (ref 41.1–50.3)
HEMOCCULT STL QL: POSITIVE
HGB BLD-MCNC: 11.1 G/DL (ref 13.6–17.2)
IMM GRANULOCYTES # BLD: 0 K/UL (ref 0–0.5)
IMM GRANULOCYTES NFR BLD AUTO: 1 % (ref 0–5)
LYMPHOCYTES # BLD: 0.8 K/UL (ref 0.5–4.6)
LYMPHOCYTES NFR BLD: 12 % (ref 13–44)
MCH RBC QN AUTO: 32.6 PG (ref 26.1–32.9)
MCHC RBC AUTO-ENTMCNC: 34.5 G/DL (ref 31.4–35)
MCV RBC AUTO: 94.4 FL (ref 79.6–97.8)
MM INDURATION POC: 0 MM (ref 0–5)
MONOCYTES # BLD: 0.9 K/UL (ref 0.1–1.3)
MONOCYTES NFR BLD: 13 % (ref 4–12)
NEUTS SEG # BLD: 4.8 K/UL (ref 1.7–8.2)
NEUTS SEG NFR BLD: 73 % (ref 43–78)
P-R INTERVAL, ECG05: 144 MS
PLATELET # BLD AUTO: 100 K/UL (ref 150–450)
PMV BLD AUTO: 10.6 FL (ref 10.8–14.1)
POTASSIUM SERPL-SCNC: 3.8 MMOL/L (ref 3.5–5.1)
PPD POC: NORMAL NEGATIVE
Q-T INTERVAL, ECG07: 492 MS
QRS DURATION, ECG06: 78 MS
QTC CALCULATION (BEZET), ECG08: 435 MS
RBC # BLD AUTO: 3.41 M/UL (ref 4.23–5.67)
SERVICE CMNT-IMP: NORMAL
SODIUM SERPL-SCNC: 147 MMOL/L (ref 136–145)
VENTRICULAR RATE, ECG03: 47 BPM
WBC # BLD AUTO: 6.6 K/UL (ref 4.3–11.1)

## 2018-07-08 PROCEDURE — 36415 COLL VENOUS BLD VENIPUNCTURE: CPT | Performed by: INTERNAL MEDICINE

## 2018-07-08 PROCEDURE — 87040 BLOOD CULTURE FOR BACTERIA: CPT | Performed by: INTERNAL MEDICINE

## 2018-07-08 PROCEDURE — 94660 CPAP INITIATION&MGMT: CPT

## 2018-07-08 PROCEDURE — 77030020250 HC SOL INJ D 5% LFCR 1000ML BG LF

## 2018-07-08 PROCEDURE — 74011250636 HC RX REV CODE- 250/636: Performed by: FAMILY MEDICINE

## 2018-07-08 PROCEDURE — 94640 AIRWAY INHALATION TREATMENT: CPT

## 2018-07-08 PROCEDURE — 85025 COMPLETE CBC W/AUTO DIFF WBC: CPT | Performed by: FAMILY MEDICINE

## 2018-07-08 PROCEDURE — 80048 BASIC METABOLIC PNL TOTAL CA: CPT | Performed by: FAMILY MEDICINE

## 2018-07-08 PROCEDURE — 74011000250 HC RX REV CODE- 250: Performed by: FAMILY MEDICINE

## 2018-07-08 PROCEDURE — 36430 TRANSFUSION BLD/BLD COMPNT: CPT

## 2018-07-08 PROCEDURE — P9016 RBC LEUKOCYTES REDUCED: HCPCS | Performed by: FAMILY MEDICINE

## 2018-07-08 PROCEDURE — 74011250636 HC RX REV CODE- 250/636: Performed by: INTERNAL MEDICINE

## 2018-07-08 PROCEDURE — 82962 GLUCOSE BLOOD TEST: CPT

## 2018-07-08 PROCEDURE — 65270000029 HC RM PRIVATE

## 2018-07-08 PROCEDURE — 74011250637 HC RX REV CODE- 250/637: Performed by: INTERNAL MEDICINE

## 2018-07-08 PROCEDURE — 94760 N-INVAS EAR/PLS OXIMETRY 1: CPT

## 2018-07-08 PROCEDURE — 74011000250 HC RX REV CODE- 250: Performed by: INTERNAL MEDICINE

## 2018-07-08 RX ORDER — CIPROFLOXACIN 2 MG/ML
400 INJECTION, SOLUTION INTRAVENOUS EVERY 12 HOURS
Status: DISCONTINUED | OUTPATIENT
Start: 2018-07-08 | End: 2018-07-12 | Stop reason: HOSPADM

## 2018-07-08 RX ORDER — METRONIDAZOLE 500 MG/100ML
500 INJECTION, SOLUTION INTRAVENOUS EVERY 8 HOURS
Status: DISCONTINUED | OUTPATIENT
Start: 2018-07-08 | End: 2018-07-12 | Stop reason: HOSPADM

## 2018-07-08 RX ORDER — NITROGLYCERIN 0.4 MG/1
0.4 TABLET SUBLINGUAL AS NEEDED
Status: DISCONTINUED | OUTPATIENT
Start: 2018-07-08 | End: 2018-07-12 | Stop reason: HOSPADM

## 2018-07-08 RX ADMIN — BUDESONIDE 500 MCG: 0.5 INHALANT RESPIRATORY (INHALATION) at 20:36

## 2018-07-08 RX ADMIN — FOLIC ACID 1 MG: 1 TABLET ORAL at 09:43

## 2018-07-08 RX ADMIN — IPRATROPIUM BROMIDE AND ALBUTEROL SULFATE 3 ML: .5; 3 SOLUTION RESPIRATORY (INHALATION) at 07:10

## 2018-07-08 RX ADMIN — METRONIDAZOLE 500 MG: 500 INJECTION, SOLUTION INTRAVENOUS at 22:36

## 2018-07-08 RX ADMIN — PROCHLORPERAZINE EDISYLATE 5 MG: 5 INJECTION INTRAMUSCULAR; INTRAVENOUS at 12:56

## 2018-07-08 RX ADMIN — FAMOTIDINE 20 MG: 10 INJECTION, SOLUTION INTRAVENOUS at 09:44

## 2018-07-08 RX ADMIN — METRONIDAZOLE 500 MG: 500 INJECTION, SOLUTION INTRAVENOUS at 14:03

## 2018-07-08 RX ADMIN — CIPROFLOXACIN 400 MG: 2 INJECTION, SOLUTION INTRAVENOUS at 09:42

## 2018-07-08 RX ADMIN — Medication 10 ML: at 22:45

## 2018-07-08 RX ADMIN — METRONIDAZOLE 500 MG: 500 INJECTION, SOLUTION INTRAVENOUS at 06:20

## 2018-07-08 RX ADMIN — IPRATROPIUM BROMIDE AND ALBUTEROL SULFATE 3 ML: .5; 3 SOLUTION RESPIRATORY (INHALATION) at 20:36

## 2018-07-08 RX ADMIN — IPRATROPIUM BROMIDE AND ALBUTEROL SULFATE 3 ML: .5; 3 SOLUTION RESPIRATORY (INHALATION) at 14:20

## 2018-07-08 RX ADMIN — BUDESONIDE 500 MCG: 0.5 INHALANT RESPIRATORY (INHALATION) at 07:10

## 2018-07-08 RX ADMIN — METRONIDAZOLE 500 MG: 500 INJECTION, SOLUTION INTRAVENOUS at 17:30

## 2018-07-08 RX ADMIN — CIPROFLOXACIN 400 MG: 2 INJECTION, SOLUTION INTRAVENOUS at 22:36

## 2018-07-08 RX ADMIN — HYDROCODONE BITARTRATE AND ACETAMINOPHEN 1 TABLET: 10; 325 TABLET ORAL at 11:57

## 2018-07-08 RX ADMIN — FAMOTIDINE 20 MG: 10 INJECTION, SOLUTION INTRAVENOUS at 22:36

## 2018-07-08 RX ADMIN — FUROSEMIDE 40 MG: 40 TABLET ORAL at 09:52

## 2018-07-08 RX ADMIN — Medication 10 ML: at 13:04

## 2018-07-08 RX ADMIN — METOPROLOL TARTRATE 50 MG: 50 TABLET ORAL at 22:36

## 2018-07-08 RX ADMIN — IPRATROPIUM BROMIDE AND ALBUTEROL SULFATE 3 ML: .5; 3 SOLUTION RESPIRATORY (INHALATION) at 02:00

## 2018-07-08 NOTE — PROGRESS NOTES
PT note: Attempted physical therapy treatment x 2 this afternoon. On first attempt pt was preparing for breathing treatment from RT. On 2nd attempt, pt was off the floor in his electric wheelchair. RN is aware of pt being off the floor. Will attempt again at a later time. No treatment provided.  Jourdan Trivedi, PT

## 2018-07-08 NOTE — PROGRESS NOTES
Progress Note Patient: Fantasma Ryan MRN: [id number]  SSN: [id number] YOB: 1960  Age: 62 y.o. Sex: male Admit Date: 7/6/2018 LOS: 2 days Subjective:  
Cc: \" I feel weak\" Patient examined at bedside. He was noted in no distress, but stated feeling weak. He received 2 prbc and his hb is 11gr. Had positive occult blood in stool. C diff negative. He expressed having watery diarrhea still. ROS: denies fever, chills, chest pain, sob. Objective:  
 
Vitals:  
 07/08/18 0200 07/08/18 0300 07/08/18 0706 07/08/18 8464 BP: 159/79 162/81 185/84 Pulse: 67 61 (!) 57 Resp: 18 18 16 Temp: 98.2 °F (36.8 °C) 98.3 °F (36.8 °C) 98.6 °F (37 °C) SpO2: 97% 97% 96% 94% Weight:      
Height:      
  
 
Intake and Output: 
Current Shift: 07/08 0701 - 07/08 1900 In: -  
Out: 150 [Urine:150] Last three shifts: 07/06 1901 - 07/08 0700 In: -  
Out: 8769 [NTGYF:6694] Physical Exam:  
GENERAL: alert, cooperative, no distress, appears stated age EYE: negative LYMPHATIC: Cervical, supraclavicular, and axillary nodes normal.  
THROAT & NECK: dry oral mucous membranes, no exudates LUNG: bilateral air entry, he has rhonchi, minimal wheezing HEART: regular rate and rhythm, S1, S2 normal, no murmur, click, rub or gallop ABDOMEN: soft, non-tender. Bowel sounds decreased. No masses,  no organomegaly EXTREMITIES:  Bilateral pedal edema grade III, up to the thighs. Genitals: hydrocele SKIN: Normal. 
NEUROLOGIC: negative PSYCHIATRIC: non focal 
 
Lab/Data Review: All lab results for the last 24 hours reviewed. Assessment:  
 
Principal Problem: 
  Pancolitis ([address] (7/6/2018) Active Problems: 
  N&V (nausea and vomiting) (7/6/2018) Essential hypertension (7/6/2018) Panlobular emphysema ([address] (7/6/2018) Anemia (7/6/2018) [facility] ([facility] (7/6/2018) Overview: Dx early June 2018; on Xarelto Hypoalbuminemia (7/7/2018) Overview: Probably due to malnutrition Gastric cancer ([facility] (7/7/2018) COPD (chronic obstructive pulmonary disease) ([facility] (7/7/2018) SHANNON on CPAP (7/7/2018) Hypernatremia (7/7/2018) CAD (coronary artery disease) (7/7/2018) Hx of CABG (7/7/2018) Plan:  
-Subacute diarrhea, nausea and vomiting, in the setting on pancolitis on an abdomen CT scan from 6/30/18: he has watery diarrhea, but C diff is negative - continue IVF hydration - cipro and flagyl , zofran- monitor chemistry daily - GI consult tomorrow -Hypernatremia: possible due to dehydration /GI losses: improving- continue D5 gtt - monitor chemistry  
  
-History of chronic anemia: has positive occult blood test in stool: HB 11 gr, s/p 2 prbc - he has recent iron, vit b12, folate levels done at [address] on 7/2/18 ( iron 78, tibc 89, trnasf % 71, vitamin b 12 > 2000, folate 10, ferritin 221 ) - monitor HH daily - GI evaluation tomorrow  
 
-History of DVT/PE: on xarelto, but currently held in view of anemia, and anticipating possible colonoscopy if his clinical status does not improve 
 
-CAD, CABG: no chest pain, normal EF > 55% on recent echo, plavix held in light of anemia and anticipating possible colonoscopy - continue NTG, home meds 
 
-COPD, mild exacerbation: duonebs - home prn - pulmicort - mucinex 
 
-Essential hypertension: resume lopressor and lasix for now, hold lisinopril  
  
-SHANNON, on cpap -Hypoalbuminemia, caloric malnutrition / pedal edema: on lasix - nutrition consult 
 
-History of falls and debility: PT/OT- patient uses wheelchair at home due to this findings DVT ppx: ambulation Full code Disposition: home with home PT versus STR Signed By: Ana Lozano MD   
 July 8, 2018

## 2018-07-08 NOTE — PROGRESS NOTES
Patient ate Macaronni and cheese and fried chicken. Drank orange juice and vomited. Gave compazine PRN at 1256. Patients nausea is resolved and is resting.

## 2018-07-09 LAB
ABO + RH BLD: NORMAL
ALBUMIN SERPL-MCNC: 1.9 G/DL (ref 3.5–5)
ALBUMIN/GLOB SERPL: 0.8 {RATIO} (ref 1.2–3.5)
ALP SERPL-CCNC: 107 U/L (ref 50–136)
ALT SERPL-CCNC: 35 U/L (ref 12–65)
ANION GAP SERPL CALC-SCNC: 7 MMOL/L (ref 7–16)
AST SERPL-CCNC: 37 U/L (ref 15–37)
BASOPHILS # BLD: 0 K/UL (ref 0–0.2)
BASOPHILS # BLD: 0 K/UL (ref 0–0.2)
BASOPHILS NFR BLD: 0 % (ref 0–2)
BASOPHILS NFR BLD: 0 % (ref 0–2)
BILIRUB DIRECT SERPL-MCNC: 0.1 MG/DL
BILIRUB SERPL-MCNC: 0.4 MG/DL (ref 0.2–1.1)
BLD PROD TYP BPU: NORMAL
BLD PROD TYP BPU: NORMAL
BLOOD GROUP ANTIBODIES SERPL: NORMAL
BPU ID: NORMAL
BPU ID: NORMAL
BUN SERPL-MCNC: 4 MG/DL (ref 6–23)
CALCIUM SERPL-MCNC: 7.4 MG/DL (ref 8.3–10.4)
CEA SERPL-MCNC: 15.3 NG/ML (ref 0–3)
CHLORIDE SERPL-SCNC: 108 MMOL/L (ref 98–107)
CO2 SERPL-SCNC: 30 MMOL/L (ref 21–32)
CREAT SERPL-MCNC: 1.13 MG/DL (ref 0.8–1.5)
CROSSMATCH RESULT,%XM: NORMAL
CROSSMATCH RESULT,%XM: NORMAL
DIFFERENTIAL METHOD BLD: ABNORMAL
DIFFERENTIAL METHOD BLD: ABNORMAL
EOSINOPHIL # BLD: 0 K/UL (ref 0–0.8)
EOSINOPHIL # BLD: 0 K/UL (ref 0–0.8)
EOSINOPHIL NFR BLD: 1 % (ref 0.5–7.8)
EOSINOPHIL NFR BLD: 1 % (ref 0.5–7.8)
ERYTHROCYTE [DISTWIDTH] IN BLOOD BY AUTOMATED COUNT: 19.6 % (ref 11.9–14.6)
ERYTHROCYTE [DISTWIDTH] IN BLOOD BY AUTOMATED COUNT: 19.6 % (ref 11.9–14.6)
GLOBULIN SER CALC-MCNC: 2.5 G/DL (ref 2.3–3.5)
GLUCOSE BLD STRIP.AUTO-MCNC: 174 MG/DL (ref 65–100)
GLUCOSE BLD STRIP.AUTO-MCNC: 78 MG/DL (ref 65–100)
GLUCOSE BLD STRIP.AUTO-MCNC: 85 MG/DL (ref 65–100)
GLUCOSE SERPL-MCNC: 82 MG/DL (ref 65–100)
HCT VFR BLD AUTO: 21.2 % (ref 41.1–50.3)
HCT VFR BLD AUTO: 31.6 % (ref 41.1–50.3)
HGB BLD-MCNC: 11.2 G/DL (ref 13.6–17.2)
HGB BLD-MCNC: 7.4 G/DL (ref 13.6–17.2)
IMM GRANULOCYTES # BLD: 0 K/UL (ref 0–0.5)
IMM GRANULOCYTES # BLD: 0 K/UL (ref 0–0.5)
IMM GRANULOCYTES NFR BLD AUTO: 0 % (ref 0–5)
IMM GRANULOCYTES NFR BLD AUTO: 0 % (ref 0–5)
LYMPHOCYTES # BLD: 0.5 K/UL (ref 0.5–4.6)
LYMPHOCYTES # BLD: 1 K/UL (ref 0.5–4.6)
LYMPHOCYTES NFR BLD: 12 % (ref 13–44)
LYMPHOCYTES NFR BLD: 15 % (ref 13–44)
MCH RBC QN AUTO: 33.3 PG (ref 26.1–32.9)
MCH RBC QN AUTO: 33.3 PG (ref 26.1–32.9)
MCHC RBC AUTO-ENTMCNC: 35.4 G/DL (ref 31.4–35)
MCHC RBC AUTO-ENTMCNC: 35.4 G/DL (ref 31.4–35)
MCV RBC AUTO: 94 FL (ref 79.6–97.8)
MCV RBC AUTO: 94.1 FL (ref 79.6–97.8)
MONOCYTES # BLD: 0.5 K/UL (ref 0.1–1.3)
MONOCYTES # BLD: 0.6 K/UL (ref 0.1–1.3)
MONOCYTES NFR BLD: 10 % (ref 4–12)
MONOCYTES NFR BLD: 10 % (ref 4–12)
NEUTS SEG # BLD: 3.4 K/UL (ref 1.7–8.2)
NEUTS SEG # BLD: 4.9 K/UL (ref 1.7–8.2)
NEUTS SEG NFR BLD: 74 % (ref 43–78)
NEUTS SEG NFR BLD: 77 % (ref 43–78)
PLATELET # BLD AUTO: 60 K/UL (ref 150–450)
PLATELET # BLD AUTO: 97 K/UL (ref 150–450)
PMV BLD AUTO: 10.6 FL (ref 10.8–14.1)
PMV BLD AUTO: 9.7 FL (ref 10.8–14.1)
POTASSIUM SERPL-SCNC: 3.5 MMOL/L (ref 3.5–5.1)
PROT SERPL-MCNC: 4.4 G/DL (ref 6.3–8.2)
RBC # BLD AUTO: 2.22 M/UL (ref 4.23–5.67)
RBC # BLD AUTO: 3.36 M/UL (ref 4.23–5.67)
SODIUM SERPL-SCNC: 145 MMOL/L (ref 136–145)
SPECIMEN EXP DATE BLD: NORMAL
STATUS OF UNIT,%ST: NORMAL
STATUS OF UNIT,%ST: NORMAL
UNIT DIVISION, %UDIV: 0
UNIT DIVISION, %UDIV: 0
WBC # BLD AUTO: 4.5 K/UL (ref 4.3–11.1)
WBC # BLD AUTO: 6.6 K/UL (ref 4.3–11.1)

## 2018-07-09 PROCEDURE — 65270000029 HC RM PRIVATE

## 2018-07-09 PROCEDURE — 97161 PT EVAL LOW COMPLEX 20 MIN: CPT

## 2018-07-09 PROCEDURE — 93005 ELECTROCARDIOGRAM TRACING: CPT | Performed by: HOSPITALIST

## 2018-07-09 PROCEDURE — 74011250636 HC RX REV CODE- 250/636: Performed by: INTERNAL MEDICINE

## 2018-07-09 PROCEDURE — 94640 AIRWAY INHALATION TREATMENT: CPT

## 2018-07-09 PROCEDURE — 80048 BASIC METABOLIC PNL TOTAL CA: CPT | Performed by: FAMILY MEDICINE

## 2018-07-09 PROCEDURE — 74011250637 HC RX REV CODE- 250/637: Performed by: INTERNAL MEDICINE

## 2018-07-09 PROCEDURE — 80076 HEPATIC FUNCTION PANEL: CPT | Performed by: INTERNAL MEDICINE

## 2018-07-09 PROCEDURE — 84446 ASSAY OF VITAMIN E: CPT | Performed by: FAMILY MEDICINE

## 2018-07-09 PROCEDURE — 74011000250 HC RX REV CODE- 250: Performed by: INTERNAL MEDICINE

## 2018-07-09 PROCEDURE — 84590 ASSAY OF VITAMIN A: CPT | Performed by: FAMILY MEDICINE

## 2018-07-09 PROCEDURE — 82962 GLUCOSE BLOOD TEST: CPT

## 2018-07-09 PROCEDURE — 94760 N-INVAS EAR/PLS OXIMETRY 1: CPT

## 2018-07-09 PROCEDURE — 74011636637 HC RX REV CODE- 636/637: Performed by: INTERNAL MEDICINE

## 2018-07-09 PROCEDURE — 82378 CARCINOEMBRYONIC ANTIGEN: CPT | Performed by: NURSE PRACTITIONER

## 2018-07-09 PROCEDURE — 97165 OT EVAL LOW COMPLEX 30 MIN: CPT

## 2018-07-09 PROCEDURE — 85025 COMPLETE CBC W/AUTO DIFF WBC: CPT | Performed by: FAMILY MEDICINE

## 2018-07-09 PROCEDURE — 82306 VITAMIN D 25 HYDROXY: CPT | Performed by: FAMILY MEDICINE

## 2018-07-09 PROCEDURE — 74011250637 HC RX REV CODE- 250/637: Performed by: FAMILY MEDICINE

## 2018-07-09 RX ORDER — AMLODIPINE BESYLATE 5 MG/1
5 TABLET ORAL DAILY
Status: DISCONTINUED | OUTPATIENT
Start: 2018-07-09 | End: 2018-07-12

## 2018-07-09 RX ORDER — POLYETHYLENE GLYCOL 3350 17 G/17G
255 POWDER, FOR SOLUTION ORAL ONCE
Status: COMPLETED | OUTPATIENT
Start: 2018-07-10 | End: 2018-07-10

## 2018-07-09 RX ORDER — POLYETHYLENE GLYCOL 3350 17 G/17G
255 POWDER, FOR SOLUTION ORAL ONCE
Status: DISPENSED | OUTPATIENT
Start: 2018-07-09 | End: 2018-07-09

## 2018-07-09 RX ADMIN — Medication 10 ML: at 21:16

## 2018-07-09 RX ADMIN — METRONIDAZOLE 500 MG: 500 INJECTION, SOLUTION INTRAVENOUS at 13:32

## 2018-07-09 RX ADMIN — INSULIN LISPRO 2 UNITS: 100 INJECTION, SOLUTION INTRAVENOUS; SUBCUTANEOUS at 17:17

## 2018-07-09 RX ADMIN — GUAIFENESIN 600 MG: 600 TABLET, EXTENDED RELEASE ORAL at 21:15

## 2018-07-09 RX ADMIN — AMLODIPINE BESYLATE 5 MG: 5 TABLET ORAL at 09:33

## 2018-07-09 RX ADMIN — FAMOTIDINE 20 MG: 10 INJECTION, SOLUTION INTRAVENOUS at 21:16

## 2018-07-09 RX ADMIN — FUROSEMIDE 40 MG: 40 TABLET ORAL at 09:33

## 2018-07-09 RX ADMIN — HYDROCODONE BITARTRATE AND ACETAMINOPHEN 1 TABLET: 10; 325 TABLET ORAL at 21:24

## 2018-07-09 RX ADMIN — IPRATROPIUM BROMIDE AND ALBUTEROL SULFATE 3 ML: .5; 3 SOLUTION RESPIRATORY (INHALATION) at 20:51

## 2018-07-09 RX ADMIN — CIPROFLOXACIN 400 MG: 2 INJECTION, SOLUTION INTRAVENOUS at 21:25

## 2018-07-09 RX ADMIN — IPRATROPIUM BROMIDE AND ALBUTEROL SULFATE 3 ML: .5; 3 SOLUTION RESPIRATORY (INHALATION) at 14:13

## 2018-07-09 RX ADMIN — CIPROFLOXACIN 400 MG: 2 INJECTION, SOLUTION INTRAVENOUS at 09:32

## 2018-07-09 RX ADMIN — BUDESONIDE 500 MCG: 0.5 INHALANT RESPIRATORY (INHALATION) at 20:51

## 2018-07-09 RX ADMIN — IPRATROPIUM BROMIDE AND ALBUTEROL SULFATE 3 ML: .5; 3 SOLUTION RESPIRATORY (INHALATION) at 02:10

## 2018-07-09 RX ADMIN — METOPROLOL TARTRATE 50 MG: 50 TABLET ORAL at 21:15

## 2018-07-09 RX ADMIN — NITROGLYCERIN 0.4 MG: 0.4 TABLET SUBLINGUAL at 22:40

## 2018-07-09 RX ADMIN — FOLIC ACID 1 MG: 1 TABLET ORAL at 09:33

## 2018-07-09 RX ADMIN — METOPROLOL TARTRATE 50 MG: 50 TABLET ORAL at 06:27

## 2018-07-09 RX ADMIN — METRONIDAZOLE 500 MG: 500 INJECTION, SOLUTION INTRAVENOUS at 05:10

## 2018-07-09 RX ADMIN — Medication 10 ML: at 05:10

## 2018-07-09 RX ADMIN — Medication 10 ML: at 15:30

## 2018-07-09 RX ADMIN — METRONIDAZOLE 500 MG: 500 INJECTION, SOLUTION INTRAVENOUS at 22:35

## 2018-07-09 RX ADMIN — FAMOTIDINE 20 MG: 10 INJECTION, SOLUTION INTRAVENOUS at 09:34

## 2018-07-09 NOTE — PROGRESS NOTES
Progress Note Patient: Whitley Dahl MRN: [id number]  SSN: [id number] YOB: 1960  Age: 62 y.o. Sex: male Admit Date: 7/6/2018 LOS: 3 days Subjective:  
Mr Elo Burk is a 62 y.o. male patient who has a PMH of gastric cancer s/p gastrectomy, chronic anemia ( follows at [facility] ), DVT/PE (5/31/18 ) on xarelto, CAD, CABG, COPD, HTN, SHANNON on cpa, who was admitted for a 3 week history of watery diarrhea ( > 15 times daily ). He was seen at [facility] on 6/30/18 and was diagnosed with pancolitis and placed on Cipro and Flagyl. He then started having n/v and unable to tolerate the meds. Upon admission, he was noted with hypernatremia, anemia of 7. He was started on iv cipro, flagyl and IVF. Blood cultures and C diff are negative. He received 2 PRBC, had a positive FOBT, and due to persistence of diarrhea, GI was involved, with plan for EGD/Colonoscopy on 7/11/18, awaiting plavix and xarelto wash out. His hypernatremia is much improved, as well his appetite and diarrhea ( now 3-4 times day ). 
 
7/9/18: on my assessment, he was found alert, in no distress. He stated having chronic cough, and has had 3 watery bowel movements. Objective:  
 
Vitals:  
 07/09/18 0004 07/09/18 0211 07/09/18 2682 07/09/18 0725 BP: 169/85  179/89 164/74 Pulse: 71  60 61 Resp: 18 18 18 Temp: 98.2 °F (36.8 °C)  98 °F (36.7 °C) 98.5 °F (36.9 °C) SpO2: 97% 95% 94% 96% Weight:      
Height:      
  
 
Intake and Output: 
Current Shift:   
Last three shifts: 07/07 1901 - 07/09 0700 In: 560 [P.O.:560] Out: 2350 [Urine:2350] Physical Exam:  
GENERAL: alert, cooperative, no distress, appears stated age EYE: negative LYMPHATIC: Cervical, supraclavicular, and axillary nodes normal.  
THROAT & NECK: dry oral mucous membranes, no exudates LUNG: bilateral air entry, he has rhonchi, minimal wheezing HEART: regular rate and rhythm, S1, S2 normal, no murmur, click, rub or gallop ABDOMEN: soft, non-tender. Bowel sounds decreased. No masses,  no organomegaly EXTREMITIES:  Bilateral pedal edema grade III, up to the thighs. Genitals: hydrocele SKIN: Normal. 
NEUROLOGIC: negative PSYCHIATRIC: non focal 
 
Lab/Data Review: All lab results for the last 24 hours reviewed. Assessment:  
 
Principal Problem: 
  Pancolitis ([address] (7/6/2018) Active Problems: 
  N&V (nausea and vomiting) (7/6/2018) Essential hypertension (7/6/2018) Panlobular emphysema ([address] (7/6/2018) Anemia (7/6/2018) Chronic pulmonary embolism ([address] (7/6/2018) Overview: Dx early June 2018; on Xarelto Hypoalbuminemia (7/7/2018) Overview: Probably due to malnutrition Gastric cancer ([address] (7/7/2018) COPD (chronic obstructive pulmonary disease) ([facility] (7/7/2018) SHANNON on CPAP (7/7/2018) Hypernatremia (7/7/2018) CAD (coronary artery disease) (7/7/2018) Hx of CABG (7/7/2018) Plan:  
- pancolitis: still with watery diarrhea, but C diff is negative : allow po water intake- cipro and flagyl , zofran- GI f/u  
 
-Hypernatremia: possible due to dehydration /GI losses: improving- continue D5 gtt - monitor chemistry  
  
-History of chronic anemia: has positive occult blood test in stool: HB 11 gr, s/p 2 prbc - he has recent iron, vit b12, folate levels done at [facility] on 7/2/18 - monitor HH daily - GI plans EGD/Colonoscopy on 7/11/18 -History of DVT/PE: on xarelto, but currently held in view of anemia and plan for EGD/Colonoscopy 
 
-Gastric cancer, s/p gastrectomy: stable - follows at [facility] with oncology  
 
-CAD, CABG: no chest pain, normal EF > 55% on recent echo, plavix held in light of anemia and anticipating possible EGD/colonoscopy - continue NTG, home meds 
 
-COPD, mild exacerbation: duonebs - home prn - pulmicort - mucinex 
 
-Essential hypertension:  lopressor and lasix, hold lisinopril  
  
-SHANNON, on cpap -Hypoalbuminemia, caloric malnutrition / pedal edema: on lasix - nutrition consult 
 
-History of falls and debility: PT/OT- patient uses wheelchair at home due to this findings DVT ppx: ambulation Full code Disposition: home, hopefully after EGD/Colonoscopy Signed By: Brigette Jordan MD   
 July 9, 2018

## 2018-07-09 NOTE — PROGRESS NOTES
[facility] July 9, 2018 RE: Uyen Cabrales To Whom It May Concern, This is to certify that Uyen Cabrales may has been in our care since 7/6/18. His daughters Amazing Billy Carroll and Macy Vicente has been at bedside with their father. Please feel free to contact my office if you have any questions or concerns. Thank you for your assistance in this matter. Sincerely, Carol Bliss RN

## 2018-07-09 NOTE — PROGRESS NOTES
Problem: Nutrition Deficit  Goal: *Optimize nutritional status  Outcome: Progressing Towards Goal  Nutrition f/u  Assessment: Patient unable to verbalize teaching r/t dumping syndrome from Friday. Discussed potential for fat soluble vitamin deficiency (and other nutrient deficiencies related to diarrhea and disuse of supplementation) with Dr. Sariah Serrano who stated these can be assessed following colonoscopy. Diet order(s): GI Soft with ensure enlive TID (NPO with bowel prep tonight). Macronutrient needs:   EER: 3871-5882 kcal /day (25-30 kcal/kg I BW)-pt currently edematous   EPR: 70-91 grams protein/day (1-1.3 grams/kg IBW)(GFR >60)   Intake/Comparative Standards: Meal intake is variable and recorded as 10% and 75% over the weekend. This meets about 50% of kcal needs and 59% of protein needs. Nutrition Diagnosis: Inadequate oral intake r/t altered GI function as evidenced by pt with h/o gastrectomy, now with colitis, pt report of n/v/d for ~3 weeks PTA, oral intake meeting about 50% of estimated needs. Intervention:   Meals and snacks: Advance diet following colonoscopy as indicated per physician. Nutrition Supplement Therapy: Will start ensure high protein as long as diet is full liquid or higher. Recommend checking for fat soluble vitamin deficiencies and potentially adding pancreatic enzyme. Pt should be checked for iron and folate deficiencies. Education: Dumping syndrome education including foods to choose/avoid and sample meal plan. Discharge nutrition plan: Consider multivitamin and mineral supplement ongoing.   Michael Torrez, 66 [address], [address] [facility]

## 2018-07-09 NOTE — PROGRESS NOTES
Cm met with pt at bedside. Pt currently lives with spouse, 3 children and 1 grandchild. 1 stair at entry. No issues obtaining medications via insurance provider. DME in the home includes cane, rollator and electric WC. No home oxygen. Pt reports being independent with ADLs-still drives. Wife is a home nurse and can attend to pt's needs if necessary. No needs voiced at this time. CM will follow. Care Management Interventions  PCP Verified by CM:  Yes  Transition of Care Consult (CM Consult): Discharge Planning  Physical Therapy Consult: Yes  Occupational Therapy Consult: Yes  Current Support Network: Lives with Spouse  Confirm Follow Up Transport: Family  Plan discussed with Pt/Family/Caregiver: Yes  Freedom of Choice Offered: Yes  Discharge Location  Discharge Placement: Home

## 2018-07-09 NOTE — PROGRESS NOTES
Hourly rounds done. Pt denies pain, nausea, vomiting. Voiding output 1050 mL, yellow/straw/clear. No BM this shift. All needs met at this time.

## 2018-07-09 NOTE — PROGRESS NOTES
Initial visit to assess pt's spiritual needs. Ministry of presence & prayer to demonstrate caring & concern, convey emotional & spiritual support.     Chaplain Roxane Sampson MDiv,ThM,PhD

## 2018-07-09 NOTE — PROGRESS NOTES
Problem: Mobility Impaired (Adult and Pediatric)  Goal: *Acute Goals and Plan of Care (Insert Text)    PHYSICAL THERAPY: Initial Assessment, Discharge, AM 7/9/2018  INPATIENT: Hospital Day: 4  Payor: ABSOLUTE TOTAL CARE / [place] / Product Type: Managed Care Medicaid /      NAME/AGE/GENDER: Newton Winston is a 62 y.o. male   PRIMARY DIAGNOSIS: Pancolitis ([address]  N&V (nausea and vomiting) Pancolitis ([address] Pancolitis ([address]        ICD-10: Treatment Diagnosis:    · Generalized Muscle Weakness (M62.81)  · Other abnormalities of gait and mobility (R26.89)   Precaution/Allergies:  Abilify [aripiprazole]; Contrast dye [iodine]; Penicillins; and Wellbutrin [bupropion]      ASSESSMENT:     Mr. Lina Macias is a 62year old male admitted from home for persistent nausea/vomiting and pancolitis. He lives with family and is independent-mod I at baseline with mobility, ambulation. Presents in supine without complaints and is agreeable to therapy assessment. Bed mobility and transfers performed independently. Pt ambulates into bathroom independently. Ambulates for an additional 500' in hallway without gait deficits noted, no loss of balance or assistance needed and demonstrates accelerated gait pace. Returned to room and left up ad kristin with daughter present. Reports he is taking his power wc and going outside with family (ok per RN and MD). Newton Winston is functioning at baseline physically; he is independent with mobility and has no need for continued therapy services. Will discharge. Pt in agreement. This section established at most recent assessment   PROBLEM LIST (Impairments causing functional limitations):   1. none   INTERVENTIONS PLANNED: (Benefits and precautions of physical therapy have been discussed with the patient.)  1. none     TREATMENT PLAN: Frequency/Duration: pt evaluated and discharged due to independence with mobility       RECOMMENDED REHABILITATION/EQUIPMENT: (at time of discharge pending progress): Due to the probability of continued deficits (see above) this patient will not likely need continued skilled physical therapy after discharge. Equipment:    None at this time              HISTORY:   History of Present Injury/Illness (Reason for Referral):  Per H&P, \"Patient history was obtained from the ER provider prior to seeing the patient.     Patient is a 62 y.o. male who presents to the ER due to persistent abdominal pain with n/v. These symptoms originally started about 2 weeks ago and at that time, he also had watery diarrhea. He was eventually seen at [address] and diagnosed with pancolitis and placed on Cipro and Flagyl. He then started having n/v and unable to tolerate the meds. His pain is worst at LLQ and achy with some cramps as well. His course has been somewhat complicated by a recent PE as well. He is on Xarelto and fortunately has not noted any blood in his stool or melena. He is also s/p gastrectomy due to gastric cancer and did see his oncologist earlier today. He is ok from oncology stand point but they did note his anemia and recommend GI follow up. \"    Past Medical History/Comorbidities:   Mr. Elizabeth Burton  has a past medical history of Bipolar 1 disorder ([address] CAD (coronary artery disease); Cancer ([address] Chronic obstructive pulmonary disease ([address] Gastrointestinal disorder; Hypertension; Psychiatric disorder; Schizoaffective disorder ([address] and Seizures ([address] Mr. Elizabeth Burton  has a past surgical history that includes hx orthopaedic; pr cardiac surg procedure unlist; and pr abdomen surgery proc unlisted.   Social History/Living Environment:   Home Environment: Private residence  # Steps to Enter: 1  One/Two Story Residence: One story  Living Alone: No  Support Systems: Spouse/Significant Other/Partner, Child(charly), Family member(s)  Patient Expects to be Discharged to[de-identified] Private residence  Current DME Used/Available at Home: Tito De León, straight, Walker, rollator, Wheelchair, power  Prior Level of Function/Work/Activity:  Lives with family. Single story home with one small step. Independent to mod I. Reports using cane or rollator in home and power wc when going outside. One recent fall. Number of Personal Factors/Comorbidities that affect the Plan of Care: 1-2: MODERATE COMPLEXITY   EXAMINATION:   Most Recent Physical Functioning:   Gross Assessment:  AROM: Within functional limits  Strength: Within functional limits  Coordination: Within functional limits               Posture:  Posture (WDL): Within defined limits  Balance:  Sitting: Intact  Standing: Intact Bed Mobility:  Rolling: Independent  Supine to Sit: Independent  Sit to Supine: Independent  Scooting: Independent  Wheelchair Mobility:     Transfers:  Sit to Stand: Independent  Stand to Sit: Independent  Bed to Chair: Independent  Gait:     Base of Support: Narrowed  Speed/Carolann: Accelerated  Distance (ft): 500 Feet (ft)  Assistive Device:  (none)  Ambulation - Level of Assistance: Independent  Interventions: Verbal cues; Safety awareness training      Body Structures Involved:  1. None Body Functions Affected:  1. None Activities and Participation Affected:  1. None   Number of elements that affect the Plan of Care: 1-2: LOW COMPLEXITY   CLINICAL PRESENTATION:   Presentation: Stable and uncomplicated: LOW COMPLEXITY   CLINICAL DECISION MAKIN [place] [id number] AM-PAC 6 Clicks   Basic Mobility Inpatient Short Form  How much difficulty does the patient currently have. .. Unable A Lot A Little None   1. Turning over in bed (including adjusting bedclothes, sheets and blankets)? [] 1   [] 2   [] 3   [x] 4   2. Sitting down on and standing up from a chair with arms ( e.g., wheelchair, bedside commode, etc.)   [] 1   [] 2   [] 3   [x] 4   3. Moving from lying on back to sitting on the side of the bed? [] 1   [] 2   [] 3   [x] 4   How much help from another person does the patient currently need. .. Total A Lot A Little None   4. Moving to and from a bed to a chair (including a wheelchair)? [] 1   [] 2   [] 3   [x] 4   5. Need to walk in hospital room? [] 1   [] 2   [] 3   [x] 4   6. Climbing 3-5 steps with a railing? [] 1   [] 2   [x] 3   [] 4   © 2007, Trustees of [address] [place] [address], under license to [employer]. All rights reserved      Score:  Initial: 23 Most Recent: X (Date: -- )    Interpretation of Tool:  Represents activities that are increasingly more difficult (i.e. Bed mobility, Transfers, Gait). Score 24 23 22-20 19-15 14-10 9-7 6     Modifier CH CI CJ CK CL CM CN      ? Mobility - Walking and Moving Around:     - CURRENT STATUS: CI - 1%-19% impaired, limited or restricted    - GOAL STATUS: CI - 1%-19% impaired, limited or restricted    - D/C STATUS:  CI - 1%-19% impaired, limited or restricted  Payor: ABSOLUTE TOTAL CARE / [place] / Product Type: Managed Care Medicaid /      Medical Necessity:     · Pt performs mobility independently today, ambulating 500' in hallway without assistance. Reason for Services/Other Comments:  · No further PT needs; will be discharged from caseload.    Use of outcome tool(s) and clinical judgement create a POC that gives a: Clear prediction of patient's progress: LOW COMPLEXITY            TREATMENT:   (In addition to Assessment/Re-Assessment sessions the following treatments were rendered)   Pre-treatment Symptoms/Complaints:  \"thank you\"  Pain: Initial:   Pain Intensity 1: 0  Post Session:  0/10     Assessment/Reassessment only, no treatment provided today    Braces/Orthotics/Lines/Etc:   · O2 Device: Room air  Treatment/Session Assessment:    · Response to Treatment:  Pt performs mobility independently  · Interdisciplinary Collaboration:   o Physical Therapist  o Registered Nurse  · After treatment position/precautions:   o Up in chair  o Bed/Chair-wheels locked  o Call light within reach  o RN notified  o Family at bedside · Recommendations/Intent for next treatment session:  Discharge from PT; pt at baseline.   Total Treatment Duration:  PT Patient Time In/Time Out  Time In: 1045  Time Out: [address] DPT

## 2018-07-09 NOTE — PROGRESS NOTES
Interdisciplinary Rounds completed 07/09/18. Nursing, Case Management, Physician and PT present. Plan of care reviewed and updated.

## 2018-07-09 NOTE — CONSULTS
[facility] Consult Note       Primary GI Physician: Dr. Dario Lipscomb    Referring Physician: Dr. Austen Hull Date:  7/9/2018    Admit Date:  7/6/2018    Chief Complaint:  Anemia with Heme positive stool    Subjective:     History of Present Illness:  Patient is a 62 y.o. male with PMH of (but not limited to) CAD on Plavix and Xarelto (last known dose 7/6), chronic pulmonary embolism, Gastric cancer with gastrectomy 2016, COPD oxygen dependent, HTN, Seizure disorder, Bipolar and Schizoaffective disorder, chronic anemia followed by hematology, and history of colon polyps, who is seen in consultation at the request of Dr. Roland Mehta for anemia and heme positive stool. Mr. Deanna Peña is a known patient to Dr. Dario Lipscomb who was last evaluated in the office on 5/2018. At that time, he had abnormal LFTS which were concerning for possible metastasis and CT scan was ordered. This was done at [facility] 6/30/18 and revealed pancolitis. He was hospitalized there and treated with Ciprofloxacin and Flagyl. He continued to have intermittent diarrhea, abdominal pain and N&V. [place] was then [place] [facility] [place]. He has continued on Ciprofloxacin and Flagyl with improvement of his symptoms. We have been asked to see him for his anemia and heme positive stool. He has had some intermittent BRRB over the last 2 days. He had a previous Colonoscopy by Dr. Dario Lipscomb 9/8/17 which revealed a suboptimal prep, diverticular disease, and 2 colon polyps. 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 Recommend repeat surveillance in 6 mos with 2 day prep. This was rescheduled at his last ov, but the patient called and cancelled this. Labs: 7/9/18 WBC 4.5, Hgb 7.4, Hct 21.2, MCV 94.1, RDW 19.6, plt 60, BUN 4, creat 1.13, T bili 0.4, ALT 35, AST 37, , albumin 1.9. 7/7/18 C. Difficile and culture negative    EGD 9/7/17 by Dr. Dario Lipscomb revealed s/p gastrectomy.  He had a gastric mass on EGD 2016 that was positive for poorly differentiated ringlet cell cancer. He is s/p gastrectomy. PMH:  Past Medical History:   Diagnosis Date    B[facility]     CAD (coronary artery disease)     Cancer ([facility]     stomach    Chronic obstructive pulmonary disease (HCC)     Gastrointestinal disorder     stomach cancer    Hypertension     Psychiatric disorder     Schizoaffective disorder ([facility]     Seizures (HCC)        PSH:  Past Surgical History:   Procedure Laterality Date    ABDOMEN SURGERY PROC UNLISTED      stomach removed per pt    CARDIAC SURG PROCEDURE UNLIST      CABG    HX ORTHOPAEDIC      right knee replacement       Allergies: Allergies   Allergen Reactions    Abilify [Aripiprazole] Unknown (comments)    Contrast Dye [Iodine] Hives and Nausea and Vomiting     Reports he has be premedicated    Penicillins Unknown (comments)    Wellbutrin [Bupropion] Other (comments)     \"locking up\"       Home Medications:  Prior to Admission medications    Medication Sig Start Date End Date Taking? Authorizing Provider   oxyCODONE-acetaminophen (PERCOCET)  mg per tablet Take 1 Tab by mouth every six (6) hours as needed for Pain. Max Daily Amount: 4 Tabs.  4/8/17   Gabrielle Frank III, MD       [facility] Medications:  Current Facility-Administered Medications   Medication Dose Route Frequency    amLODIPine (NORVASC) tablet 5 mg  5 mg Oral DAILY    ciprofloxacin (CIPRO) 400 mg IVPB (premix)  400 mg IntraVENous Q12H    metroNIDAZOLE (FLAGYL) IVPB premix 500 mg  500 mg IntraVENous Q8H    nitroglycerin (NITROSTAT) tablet 0.4 mg  0.4 mg SubLINGual PRN    sodium chloride (NS) flush 5-10 mL  5-10 mL IntraVENous Q8H    sodium chloride (NS) flush 5-10 mL  5-10 mL IntraVENous PRN    acetaminophen (TYLENOL) tablet 650 mg  650 mg Oral Q4H PRN    naloxone (NARCAN) injection 0.4 mg  0.4 mg IntraVENous PRN    diphenhydrAMINE (BENADRYL) capsule 25 mg  25 mg Oral Q4H PRN    prochlorperazine (COMPAZINE) injection 5 mg  5 mg IntraVENous Q8H PRN    albuterol (PROVENTIL VENTOLIN) nebulizer solution 2.5 mg  2.5 mg Nebulization Q4H PRN    0.9% sodium chloride infusion 250 mL  250 mL IntraVENous PRN    dextrose 5% infusion  75 mL/hr IntraVENous CONTINUOUS    famotidine (PF) (PEPCID) 20 mg in sodium chloride 0.9% 10 mL injection  20 mg IntraVENous Q12H    metoprolol tartrate (LOPRESSOR) tablet 50 mg  50 mg Oral Q12H    furosemide (LASIX) tablet 40 mg  40 mg Oral DAILY    hydrALAZINE (APRESOLINE) 20 mg/mL injection 10 mg  10 mg IntraVENous Q6H PRN    HYDROcodone-acetaminophen (NORCO)  mg tablet 1 Tab  1 Tab Oral Q6H PRN    albuterol-ipratropium (DUO-NEB) 2.5 MG-0.5 MG/3 ML  3 mL Nebulization L0H RT    folic acid (FOLVITE) tablet 1 mg  1 mg Oral DAILY    clonazePAM (KlonoPIN) tablet 0.5 mg  0.5 mg Oral BID PRN    insulin lispro (HUMALOG) injection   SubCUTAneous AC&HS    budesonide (PULMICORT) 500 mcg/2 ml nebulizer suspension  500 mcg Nebulization BID RT    guaiFENesin ER (MUCINEX) tablet 600 mg  600 mg Oral Q12H       Social History:  Social History   Substance Use Topics    Smoking status: Former Smoker    Smokeless tobacco: Not on file      Comment: quit 30 years ago    Alcohol use No       Family History:  No family history on file. Review of Systems:  A detailed 10 system ROS is obtained, with pertinent positives as listed above. All others are negative. Diet:  GI soft    Objective:     Physical Exam:  Vitals:  Visit Vitals    /74 (BP 1 Location: Right arm, BP Patient Position: At rest)    Pulse 61    Temp 98.5 °F (36.9 °C)    Resp 18    Ht 5' 8\" (1.727 m)    Wt 75.8 kg (167 lb)    SpO2 96%    BMI 25.39 kg/m2     Gen:  Pt is alert, cooperative, no acute distress FAMILY AT BEDSIDE  Skin:  Extremities and face reveal no rashes. HEENT: Sclerae anicteric. Extra-occular muscles are intact. No oral ulcers. No abnormal pigmentation of the lips. The neck is supple.   Cardiovascular: Regular rate and rhythm. No murmurs, gallops, or rubs. Respiratory:  Comfortable breathing with no accessory muscle use. Clear breath sounds anteriorly with no wheezes, rales, or rhonchi. GI:  Abdomen nondistended, soft, and nontender. Normal active bowel sounds. No enlargement of the liver or spleen. No masses palpable. Rectal:  Deferred  Musculoskeletal:  No pitting edema of the lower legs. Neurological:  Gross memory appears intact. Patient is alert and oriented. Psychiatric:  Mood appears appropriate with judgement intact. Lymphatic:  No cervical or supraclavicular adenopathy.     Laboratory:    Recent Labs      07/09/18   0635  07/09/18   0543  07/09/18   0519  07/08/18   0558  07/07/18   0451  07/06/18   1946   WBC  6.6  4.5   --   6.6  5.1  5.1   HGB  11.2*  7.4*   --   11.1*  7.7*  7.1*   HCT  31.6*  21.2*   --   32.2*  22.8*  20.7*   PLT  97*  60*   --   100*  114*  101*   MCV  94.0  94.1   --   94.4  104.6*  105.1*   NA   --    --   145  147*  151*  151*   K   --    --   3.5  3.8  4.3  3.9   CL   --    --   108*  111*  119*  121*   CO2   --    --   30  27  23  24   BUN   --    --   4*  6  8  7   CREA   --    --   1.13  1.06  1.05  1.07   CA   --    --   7.4*  7.3*  7.2*  7.1*   MG   --    --    --    --   2.1   --    GLU   --    --   82  69  85  72   AP   --    --    --    --    --   89   SGOT   --    --    --    --    --   28   ALT   --    --    --    --    --   37   TBILI   --    --    --    --    --   0.2   ALB   --    --    --    --    --   1.9*   TP   --    --    --    --    --   4.2*   LPSE   --    --    --    --    --   29*          Assessment:     Principal Problem:    Pancolitis ([facility] (7/6/2018)    Active Problems:    N&V (nausea and vomiting) (7/6/2018)      Essential hypertension (7/6/2018)      Panlobular emphysema ([facility] (7/6/2018)      Anemia (7/6/2018)      Chronic pulmonary embolism ([facility] (7/6/2018)      Overview: Dx early June 2018; on Xarelto      Hypoalbuminemia (7/7/2018)      Overview: Probably due to malnutrition      Gastric cancer ([facility] (7/7/2018)      COPD (chronic obstructive pulmonary disease) ([facility] (7/7/2018)      SHANNON on CPAP (7/7/2018)      Hypernatremia (7/7/2018)      CAD (coronary artery disease) (7/7/2018)      Hx of CABG (7/7/2018)    62 y.o. male with PMH of (but not limited to) CAD on Plavix and Xarelto (last known dose 7/6), chronic pulmonary embolism, Gastric cancer with gastrectomy 2016, COPD oxygen dependent, HTN, Seizure disorder, Bipolar and Schizoaffective disorder, chronic anemia followed by hematology, and history of colon polyps, who is seen in consultation at the request of Dr. Arian Moses for anemia and heme positive stool. Mr. Messi Garnica is a known patient to Dr. Crow Nelson with history of gastric cancer in 2016 s/p gastrectomy. He has known colon polyps and was scheduled for recent colonoscopy which the patient cancelled. His anemia could be secondary to friable polyp/mass/ possible IBD with recent pancolitis. Recurrent gastric cancer is a potential as well. Plan: 1.Schedule EGD and colonoscopy with a 2 day prep and once off Plavix/Xarelto for 5 days (last dose 7/6). We will plan on this for Wednesday 7/11. Risks and benefits were discussed with the patient to include risk of infection, bleeding, perforation, anesthesia, missed colon polyps and possible mortality. He verbalized understanding and wishes to proceed with procedures. 2.Clear liquid diet on 7/10  3. NPO after midnight 7/10 for procedure 7/11  4. Continue to hold Plavix/Xarelto  5. CEA  6. Agree with ciprofloxacin and Flagyl    Fransisca Bolds. Adam Dixon, [address]   [facility]    Patient is seen and examined in collaboration with Dr. Johanna Chino. Assessment and plan as per Dr. Johanna Chino.

## 2018-07-09 NOTE — PROGRESS NOTES
Problem: Self Care Deficits Care Plan (Adult)  Goal: *Acute Goals and Plan of Care (Insert Text)    OCCUPATIONAL THERAPY: Initial Assessment and PM 7/9/2018  INPATIENT: Hospital Day: 4  Payor: ABSOLUTE TOTAL CARE / [place] / Product Type: Managed Care Medicaid /      NAME/AGE/GENDER: Ignacio Collado is a 62 y.o. male   PRIMARY DIAGNOSIS:  Abnormal abdominal CT scan [R93.5]  Anemia, unspecified type [D64.9] Pancolitis (HCC) Pancolitis (HCC)  Procedure(s) (LRB):  ESOPHAGOGASTRODUODENOSCOPY (EGD) (N/A)  COLONOSCOPY/32/ 612 (N/A)     ICD-10: Treatment Diagnosis:    · Generalized Muscle Weakness (M62.81)   Precautions/Allergies:    falls Abilify [aripiprazole]; Contrast dye [iodine]; Penicillins; and Wellbutrin [bupropion]      ASSESSMENT:     Mr. Denton Primrose was admitted with pancolitis. Pt lives with his family in a one level house with a tub shower and reports that he was mainly independent at baseline. Pt has a history of BLE lymphedema and requires assistance for lower body dressing and uses a power WC for mobility when he has a lymphedema flare. This session, pt presented supine in bed, pleasant and agreeable to OT session. Pt donned/ doffed socks independently and completed bed mobility and mobility in room with SBA without an AD. Pt educated on adaptive techniques for ADLs and on recommended DME to promote safety and independence with ADLs when he has lymphedema flares. Pt verbalized good understanding. Pt currently did not demonstrate any deficits in ADLs and does not require further skilled OT services in this setting, rec d/c home with family when medically stable. This section established at most recent assessment   PROBLEM LIST (Impairments causing functional limitations):  1.  Increased Pain   INTERVENTIONS PLANNED: (Benefits and precautions of occupational therapy have been discussed with the patient.)  1. n/a     TREATMENT PLAN: Frequency/Duration: Follow patient 0 to address above goals.  Rehabilitation Potential For Stated Goals: n/a     RECOMMENDED REHABILITATION/EQUIPMENT: (at time of discharge pending progress): Due to the probability of continued deficits (see above) this patient will not likely need continued skilled occupational therapy after discharge. Equipment:    tub bench, grab bars in tub shower, reacher, sock aide              OCCUPATIONAL PROFILE AND HISTORY:   History of Present Injury/Illness (Reason for Referral):  See H&P   Past Medical History/Comorbidities:   Mr. Messi Garnica  has a past medical history of Bipolar 1 disorder ([facility] CAD (coronary artery disease); Cancer ([facility] Chronic obstructive pulmonary disease ([facility] Gastrointestinal disorder; Hypertension; Psychiatric disorder; Schizoaffective disorder ([facility] and Seizures ([facility] Mr. Messi Garnica  has a past surgical history that includes hx orthopaedic; pr cardiac surg procedure unlist; and pr abdomen surgery proc unlisted. Social History/Living Environment:   Home Environment: Private residence  # Steps to Enter: 1  One/Two Story Residence: One story  Living Alone: No  Support Systems: Child(charly), Family member(s), Spouse/Significant Other/Partner  Patient Expects to be Discharged to[de-identified] Private residence  Current DME Used/Available at Home: Cane, straight, Wheelchair, power, Walker  Tub or Shower Type: Tub/Shower combination  Prior Level of Function/Work/Activity:  Pt lives with his family in a one level house with a tub shower and reports that he was mainly independent at baseline. Pt has a history of BLE lymphedema and requires assistance for lower body dressing and uses a power WC for mobility when he has a lymphedema flare.       Number of Personal Factors/Comorbidities that affect the Plan of Care: Brief history (0):  LOW COMPLEXITY   ASSESSMENT OF OCCUPATIONAL PERFORMANCE[de-identified]   Activities of Daily Living:   Basic ADLs (From Assessment) Complex ADLs (From Assessment)   Feeding: Independent  Oral Facial Hygiene/Grooming: Independent  Bathing: Independent  Upper Body Dressing: Independent  Lower Body Dressing: Independent  Toileting: Independent Instrumental ADL  Meal Preparation: Independent  Homemaking: Independent  Medication Management: Independent   Grooming/Bathing/Dressing Activities of Daily Living     Cognitive Retraining  Safety/Judgement: Awareness of environment; Fall prevention                 Functional Transfers  Toilet Transfer : Independent     Bed/Mat Mobility  Rolling: Independent  Supine to Sit: Independent  Sit to Supine: Independent  Sit to Stand: Independent  Bed to Chair: Independent  Scooting: Independent       Most Recent Physical Functioning:   Gross Assessment:  AROM: Within functional limits  Strength: Within functional limits  Coordination: Within functional limits               Posture:  Posture (WDL): Within defined limits  Balance:  Sitting: Intact  Standing: Intact Bed Mobility:  Rolling: Independent  Supine to Sit: Independent  Sit to Supine: Independent  Scooting: Independent  Wheelchair Mobility:     Transfers:  Sit to Stand: Independent  Stand to Sit: Independent  Bed to Chair: Independent            Patient Vitals for the past 6 hrs:   BP BP Patient Position SpO2 Pulse   18 1413 - - 93 % -   18 1414 131/78 At rest 93 % 73       Mental Status  Neurologic State: Alert  Orientation Level: Oriented X4  Cognition: Appropriate decision making, Follows commands  Perception: Appears intact  Perseveration: No perseveration noted  Safety/Judgement: Awareness of environment, Fall prevention                          Physical Skills Involved:  1. Activity Tolerance Cognitive Skills Affected (resulting in the inability to perform in a timely and safe manner): 1. none Psychosocial Skills Affected:  1.  Environmental Adaptation   Number of elements that affect the Plan of Care: 1-3:  LOW COMPLEXITY   CLINICAL DECISION MAKIN [facility] [address] [address] AM-PAC 6 [facility]   Daily Activity Inpatient [place] Form  How much help from another person does the patient currently need. .. Total A Lot A Little None   1. Putting on and taking off regular lower body clothing? [] 1   [] 2   [] 3   [x] 4   2. Bathing (including washing, rinsing, drying)? [] 1   [] 2   [] 3   [x] 4   3. Toileting, which includes using toilet, bedpan or urinal?   [] 1   [] 2   [] 3   [x] 4   4. Putting on and taking off regular upper body clothing? [] 1   [] 2   [] 3   [x] 4   5. Taking care of personal grooming such as brushing teeth? [] 1   [] 2   [] 3   [x] 4   6. Eating meals? [] 1   [] 2   [] 3   [x] 4   © 2007, Trustees of [address] [place] [address] [address] [address], under license to [employer]. All rights reserved      Score:  Initial:24 Most Recent: X (Date: -- )    Interpretation of Tool:  Represents activities that are increasingly more difficult (i.e. Bed mobility, Transfers, Gait). Score 24 23 22-20 19-15 14-10 9-7 6     Modifier CH CI CJ CK CL CM CN      ?  Self Care:     - CURRENT STATUS: CH - 0% impaired, limited or restricted    - GOAL STATUS: CH - 0% impaired, limited or restricted    - D/C STATUS:  CH - 0% impaired, limited or restricted  Payor: ABSOLUTE TOTAL CARE / [place] / Product Type: Managed Care Medicaid /         Use of outcome tool(s) and clinical judgement create a POC that gives a: LOW COMPLEXITY         TREATMENT:   (In addition to Assessment/Re-Assessment sessions the following treatments were rendered)     Pre-treatment Symptoms/Complaints:    Pain: Initial:   Pain Intensity 1: 3  Pain Location 1: Abdomen  Pain Intervention(s) 1:  (pre-medicated, repositioned)  Post Session:  same     Assessment/Reassessment only, no treatment provided today    Braces/Orthotics/Lines/Etc:   · O2 Device: Room air  Treatment/Session Assessment:    · Response to Treatment:  No adverse reaction   · Interdisciplinary Collaboration:   o Occupational Therapist  o Registered Nurse  · After treatment position/precautions:   o Supine in bed  o Bed/Chair-wheels locked  o Bed in low position  o Call light within reach  o RN notified     Total Treatment Duration:  OT Patient Time In/Time Out  Time In: 1349  Time Out: 1501 Amaya Menendez, OT

## 2018-07-10 ENCOUNTER — ANESTHESIA EVENT (OUTPATIENT)
Dept: ENDOSCOPY | Age: 58
DRG: 245 | End: 2018-07-10
Payer: COMMERCIAL

## 2018-07-10 LAB
25(OH)D3+25(OH)D2 SERPL-MCNC: 11.3 NG/ML (ref 30–100)
ANION GAP SERPL CALC-SCNC: 9 MMOL/L (ref 7–16)
ATRIAL RATE: 58 BPM
BASOPHILS # BLD: 0 K/UL (ref 0–0.2)
BASOPHILS NFR BLD: 0 % (ref 0–2)
BUN SERPL-MCNC: 4 MG/DL (ref 6–23)
CALCIUM SERPL-MCNC: 7.5 MG/DL (ref 8.3–10.4)
CALCULATED P AXIS, ECG09: 64 DEGREES
CALCULATED T AXIS, ECG11: -66 DEGREES
CHLORIDE SERPL-SCNC: 108 MMOL/L (ref 98–107)
CO2 SERPL-SCNC: 27 MMOL/L (ref 21–32)
CREAT SERPL-MCNC: 0.93 MG/DL (ref 0.8–1.5)
DIAGNOSIS, 93000: NORMAL
DIFFERENTIAL METHOD BLD: ABNORMAL
EOSINOPHIL # BLD: 0.1 K/UL (ref 0–0.8)
EOSINOPHIL NFR BLD: 1 % (ref 0.5–7.8)
ERYTHROCYTE [DISTWIDTH] IN BLOOD BY AUTOMATED COUNT: 18.7 % (ref 11.9–14.6)
GLUCOSE BLD STRIP.AUTO-MCNC: 123 MG/DL (ref 65–100)
GLUCOSE BLD STRIP.AUTO-MCNC: 86 MG/DL (ref 65–100)
GLUCOSE BLD STRIP.AUTO-MCNC: 97 MG/DL (ref 65–100)
GLUCOSE BLD STRIP.AUTO-MCNC: 99 MG/DL (ref 65–100)
GLUCOSE SERPL-MCNC: 82 MG/DL (ref 65–100)
HCT VFR BLD AUTO: 34.6 % (ref 41.1–50.3)
HGB BLD-MCNC: 12.1 G/DL (ref 13.6–17.2)
IMM GRANULOCYTES # BLD: 0 K/UL (ref 0–0.5)
IMM GRANULOCYTES NFR BLD AUTO: 0 % (ref 0–5)
INR PPP: 1.1
LYMPHOCYTES # BLD: 0.9 K/UL (ref 0.5–4.6)
LYMPHOCYTES NFR BLD: 14 % (ref 13–44)
MCH RBC QN AUTO: 33.2 PG (ref 26.1–32.9)
MCHC RBC AUTO-ENTMCNC: 35 G/DL (ref 31.4–35)
MCV RBC AUTO: 94.8 FL (ref 79.6–97.8)
MONOCYTES # BLD: 0.8 K/UL (ref 0.1–1.3)
MONOCYTES NFR BLD: 12 % (ref 4–12)
NEUTS SEG # BLD: 4.6 K/UL (ref 1.7–8.2)
NEUTS SEG NFR BLD: 73 % (ref 43–78)
P-R INTERVAL, ECG05: 130 MS
PLATELET # BLD AUTO: 92 K/UL (ref 150–450)
PMV BLD AUTO: 10.1 FL (ref 10.8–14.1)
POTASSIUM SERPL-SCNC: 3.3 MMOL/L (ref 3.5–5.1)
PROTHROMBIN TIME: 13.5 SEC (ref 11.5–14.5)
Q-T INTERVAL, ECG07: 468 MS
QRS DURATION, ECG06: 78 MS
QTC CALCULATION (BEZET), ECG08: 459 MS
RBC # BLD AUTO: 3.65 M/UL (ref 4.23–5.67)
SODIUM SERPL-SCNC: 144 MMOL/L (ref 136–145)
TROPONIN I SERPL-MCNC: <0.02 NG/ML (ref 0.02–0.05)
VENTRICULAR RATE, ECG03: 58 BPM
WBC # BLD AUTO: 6.4 K/UL (ref 4.3–11.1)

## 2018-07-10 PROCEDURE — 74011000250 HC RX REV CODE- 250: Performed by: INTERNAL MEDICINE

## 2018-07-10 PROCEDURE — 85025 COMPLETE CBC W/AUTO DIFF WBC: CPT | Performed by: INTERNAL MEDICINE

## 2018-07-10 PROCEDURE — 85610 PROTHROMBIN TIME: CPT | Performed by: INTERNAL MEDICINE

## 2018-07-10 PROCEDURE — 74011250637 HC RX REV CODE- 250/637: Performed by: INTERNAL MEDICINE

## 2018-07-10 PROCEDURE — 94760 N-INVAS EAR/PLS OXIMETRY 1: CPT

## 2018-07-10 PROCEDURE — 65270000029 HC RM PRIVATE

## 2018-07-10 PROCEDURE — 80048 BASIC METABOLIC PNL TOTAL CA: CPT | Performed by: INTERNAL MEDICINE

## 2018-07-10 PROCEDURE — 74011250637 HC RX REV CODE- 250/637: Performed by: NURSE PRACTITIONER

## 2018-07-10 PROCEDURE — 74011250636 HC RX REV CODE- 250/636: Performed by: INTERNAL MEDICINE

## 2018-07-10 PROCEDURE — 82962 GLUCOSE BLOOD TEST: CPT

## 2018-07-10 PROCEDURE — 84484 ASSAY OF TROPONIN QUANT: CPT | Performed by: HOSPITALIST

## 2018-07-10 PROCEDURE — 94640 AIRWAY INHALATION TREATMENT: CPT

## 2018-07-10 PROCEDURE — 36415 COLL VENOUS BLD VENIPUNCTURE: CPT | Performed by: HOSPITALIST

## 2018-07-10 RX ORDER — SODIUM CHLORIDE, SODIUM LACTATE, POTASSIUM CHLORIDE, CALCIUM CHLORIDE 600; 310; 30; 20 MG/100ML; MG/100ML; MG/100ML; MG/100ML
100 INJECTION, SOLUTION INTRAVENOUS CONTINUOUS
Status: CANCELLED | OUTPATIENT
Start: 2018-07-10

## 2018-07-10 RX ORDER — BISACODYL 5 MG
10 TABLET, DELAYED RELEASE (ENTERIC COATED) ORAL
Status: COMPLETED | OUTPATIENT
Start: 2018-07-10 | End: 2018-07-10

## 2018-07-10 RX ORDER — MAGNESIUM CITRATE
296 SOLUTION, ORAL ORAL
Status: COMPLETED | OUTPATIENT
Start: 2018-07-10 | End: 2018-07-10

## 2018-07-10 RX ORDER — FACIAL-BODY WIPES
10 EACH TOPICAL
Status: COMPLETED | OUTPATIENT
Start: 2018-07-10 | End: 2018-07-10

## 2018-07-10 RX ORDER — POTASSIUM CHLORIDE 20 MEQ/1
40 TABLET, EXTENDED RELEASE ORAL
Status: COMPLETED | OUTPATIENT
Start: 2018-07-10 | End: 2018-07-10

## 2018-07-10 RX ADMIN — POLYETHYLENE GLYCOL 3350 255 G: 17 POWDER, FOR SOLUTION ORAL at 17:39

## 2018-07-10 RX ADMIN — IPRATROPIUM BROMIDE AND ALBUTEROL SULFATE 3 ML: .5; 3 SOLUTION RESPIRATORY (INHALATION) at 02:12

## 2018-07-10 RX ADMIN — AMLODIPINE BESYLATE 5 MG: 5 TABLET ORAL at 09:14

## 2018-07-10 RX ADMIN — FAMOTIDINE 20 MG: 10 INJECTION, SOLUTION INTRAVENOUS at 09:13

## 2018-07-10 RX ADMIN — FUROSEMIDE 40 MG: 40 TABLET ORAL at 09:13

## 2018-07-10 RX ADMIN — MAGESIUM CITRATE 296 ML: 1.75 LIQUID ORAL at 10:00

## 2018-07-10 RX ADMIN — FAMOTIDINE 20 MG: 10 INJECTION, SOLUTION INTRAVENOUS at 22:29

## 2018-07-10 RX ADMIN — Medication 10 ML: at 14:49

## 2018-07-10 RX ADMIN — HYDROCODONE BITARTRATE AND ACETAMINOPHEN 1 TABLET: 10; 325 TABLET ORAL at 11:25

## 2018-07-10 RX ADMIN — BISACODYL 10 MG: 5 TABLET, COATED ORAL at 17:38

## 2018-07-10 RX ADMIN — METRONIDAZOLE 500 MG: 500 INJECTION, SOLUTION INTRAVENOUS at 14:48

## 2018-07-10 RX ADMIN — IPRATROPIUM BROMIDE AND ALBUTEROL SULFATE 3 ML: .5; 3 SOLUTION RESPIRATORY (INHALATION) at 13:09

## 2018-07-10 RX ADMIN — BUDESONIDE 500 MCG: 0.5 INHALANT RESPIRATORY (INHALATION) at 20:05

## 2018-07-10 RX ADMIN — POTASSIUM CHLORIDE 40 MEQ: 20 TABLET, EXTENDED RELEASE ORAL at 09:13

## 2018-07-10 RX ADMIN — METOPROLOL TARTRATE 50 MG: 50 TABLET ORAL at 09:14

## 2018-07-10 RX ADMIN — Medication 5 ML: at 06:00

## 2018-07-10 RX ADMIN — METRONIDAZOLE 500 MG: 500 INJECTION, SOLUTION INTRAVENOUS at 05:42

## 2018-07-10 RX ADMIN — BUDESONIDE 500 MCG: 0.5 INHALANT RESPIRATORY (INHALATION) at 07:07

## 2018-07-10 RX ADMIN — IPRATROPIUM BROMIDE AND ALBUTEROL SULFATE 3 ML: .5; 3 SOLUTION RESPIRATORY (INHALATION) at 07:07

## 2018-07-10 RX ADMIN — CIPROFLOXACIN 400 MG: 2 INJECTION, SOLUTION INTRAVENOUS at 09:15

## 2018-07-10 RX ADMIN — CIPROFLOXACIN 400 MG: 2 INJECTION, SOLUTION INTRAVENOUS at 23:06

## 2018-07-10 RX ADMIN — METRONIDAZOLE 500 MG: 500 INJECTION, SOLUTION INTRAVENOUS at 22:29

## 2018-07-10 RX ADMIN — FOLIC ACID 1 MG: 1 TABLET ORAL at 09:15

## 2018-07-10 RX ADMIN — HYDROCODONE BITARTRATE AND ACETAMINOPHEN 1 TABLET: 10; 325 TABLET ORAL at 23:09

## 2018-07-10 RX ADMIN — METOPROLOL TARTRATE 50 MG: 50 TABLET ORAL at 22:28

## 2018-07-10 RX ADMIN — Medication 10 ML: at 22:34

## 2018-07-10 RX ADMIN — BISACODYL 10 MG: 10 SUPPOSITORY RECTAL at 09:13

## 2018-07-10 RX ADMIN — IPRATROPIUM BROMIDE AND ALBUTEROL SULFATE 3 ML: .5; 3 SOLUTION RESPIRATORY (INHALATION) at 20:05

## 2018-07-10 NOTE — PROGRESS NOTES
Chart reviewed. We have requested [facility] imaging studies. Patient is tentatively scheduled for IVC filter placement tomorrow.     Lopez Owusu MD

## 2018-07-10 NOTE — PROGRESS NOTES
Pt c/o CP and tightness; states he frequently experiences same type of CP post CABG and usually uses Nitro patch daily. Will administer Nitro tab per [facility]. MD paged at this time. Order for EKG, troponin, and remote telemetry. RT Omar notified of need of EKG; Yanely in lab notified of new Troponin order. Will continue to monitor. MD has seen pt at bedside [id number].

## 2018-07-10 NOTE — PROGRESS NOTES
Hourly rounds performed; all pt needs met. CP this shift alleviated per pt with Nitro tab. Bed L/L, SR up x2, call light/ personal items within reach. Bedside shift report to Yoly Almaraz RN.

## 2018-07-10 NOTE — PROGRESS NOTES
Pt and daughter requested to walk downstairs around the hospital and insisted they have been doing it since pt has been in the hospital this admission. Nurse told pt is not allowed for him to get off the floor since he has an active line. Pt refused and said to the nurse he has had his line for 2 yrs and had no problem with it. Nurse capped pt's line and pt and daughter went off the floor.

## 2018-07-10 NOTE — PROGRESS NOTES
Pt c/o stomach cramps after drinking ordered magnesium citrate per GI, pain medicine given per [facility] and Dr. Aleisha Varela notified. Will continue to monitor.

## 2018-07-10 NOTE — PROGRESS NOTES
Assessing pt for the morning and pt c/o to the nurse that he has been calling for a bath since 6 AM this morning. CNA walked in room and offered pt a bath nut pt refused and stated he has been calling since 6 AM and does not want a bath any more. Nurse attempted to calm pt down and told pt to call and let staff know if he changes his mind to get a bath.

## 2018-07-10 NOTE — PROGRESS NOTES
Hospitalist Progress Note Admit Date:  2018  6:46 PM  
Name:  Irineo Tidwell Age:  62 y.o. 
:  1960 MRN:  [id number] PCP:  Charles Ayoub MD 
Treatment Team: Attending Provider: José Antonio Zavala MD; Consulting Provider: Nissa Corea MD; Care Manager: Douglas Fletcher LMSW Subjective:  
 
 
Mr. Reuben Muñoz is a 61 yo male with PMH of gastric cancer s/p gastrectomy, anemia, DVT/PE on xarelto since  [facility], CAD on plavix s/p CABG, COPD, SHANNON admitted with loose BM in the setting of recently diagnosed pancolitis on cipro/flagyl, acute blood loss anemia and JAYLIN. He has been managed with IVF, PRBC and IV cipro/flagyl. BC and stool studies are negative. GI consulted and plans for colonoscopy/ EGD 18. Plans are for home once improved. 7-10-18 ambulating in room, asking for shower, wants to have some breakfast, has less loose BM, had chest pain overnight that improved with nitro and has resolved, some cough, some edema, no dyspnea [facility] records reviewed and noted he had NM stress testing 18 low risk CT chest 5,8737 [facility] small PE 
LE duplex- bilateral DVT Objective:  
Patient Vitals for the past 24 hrs: 
 Temp Pulse Resp BP SpO2  
07/10/18 1310 - - - - 90 % 07/10/18 1104 98 °F (36.7 °C) (!) 58 18 168/84 94 % 07/10/18 0720 98.1 °F (36.7 °C) 68 18 167/85 94 % 07/10/18 0707 - - - - 90 % 07/10/18 0532 98 °F (36.7 °C) 60 18 142/82 97 % 07/10/18 0229 - 62 - 127/67 -  
07/10/18 0212 - - - - 94 % 18 2334 98.8 °F (37.1 °C) 66 18 (!) 165/95 99 % 183 - - - - 94 % 189 98.7 °F (37.1 °C) 66 18 139/78 94 % Oxygen Therapy O2 Sat (%): 90 % (07/10/18 1310) Pulse via Oximetry: 87 beats per minute (07/10/18 131) O2 Device: Room air (07/10/18 131) Intake/Output Summary (Last 24 hours) at 07/10/18 1539 Last data filed at 07/10/18 1468 Gross per 24 hour Intake              240 ml Output              550 ml Net             -310 ml  
   
 
General:    Well nourished. Alert. No distress CV:   RRR. No murmur, rub, or gallop. 1+ left > right edema Lungs:   CTAB. No wheezing, rhonchi, or rales. Abdomen:   Soft, nontender, nondistended. Decreased BS Extremities: Warm and dry. Skin:     No rashes or jaundice. Neuro:  No gross focal deficits Data Review: 
I have reviewed all labs, meds, telemetry events, and studies from the last 24 hours: 
 
Recent Results (from the past 24 hour(s)) GLUCOSE, POC Collection Time: 07/09/18  4:12 PM  
Result Value Ref Range Glucose (POC) 174 (H) 65 - 100 mg/dL GLUCOSE, POC Collection Time: 07/09/18  9:50 PM  
Result Value Ref Range Glucose (POC) 85 65 - 100 mg/dL EKG, 12 LEAD, INITIAL Collection Time: 07/09/18 11:33 PM  
Result Value Ref Range Ventricular Rate 58 BPM  
 Atrial Rate 58 BPM  
 P-R Interval 130 ms QRS Duration 78 ms Q-T Interval 468 ms QTC Calculation (Bezet) 459 ms Calculated P Axis 64 degrees Calculated T Axis -66 degrees Diagnosis Sinus bradycardia with Premature atrial complexes ST & T wave abnormality, consider inferior ischemia ST & T wave abnormality, consider anterior ischemia Abnormal ECG When compared with ECG of 06-JUL-2018 21:03, 
Premature atrial complexes are now Present Confirmed by FRAN BAILEY (), Jacqueline Miller ([id number]) on 7/10/2018 7:32:59 AM 
  
TROPONIN I Collection Time: 07/10/18 12:11 AM  
Result Value Ref Range Troponin-I, Qt. <0.02 (L) 0.02 - 0.05 NG/ML  
PROTHROMBIN TIME + INR Collection Time: 07/10/18  5:55 AM  
Result Value Ref Range Prothrombin time 13.5 11.5 - 14.5 sec INR 1.1    
CBC WITH AUTOMATED DIFF Collection Time: 07/10/18  5:55 AM  
Result Value Ref Range WBC 6.4 4.3 - 11.1 K/uL  
 RBC 3.65 (L) 4.23 - 5.67 M/uL  
 HGB 12.1 (L) 13.6 - 17.2 g/dL HCT 34.6 (L) 41.1 - 50.3 %  MCV 94.8 79.6 - 97.8 FL  
 MCH 33.2 (H) 26.1 - 32.9 PG  
 MCHC 35.0 31.4 - 35.0 g/dL  
 RDW 18.7 (H) 11.9 - 14.6 % PLATELET 92 (L) 511 - 450 K/uL MPV 10.1 (L) 10.8 - 14.1 FL  
 DF AUTOMATED NEUTROPHILS 73 43 - 78 % LYMPHOCYTES 14 13 - 44 % MONOCYTES 12 4.0 - 12.0 % EOSINOPHILS 1 0.5 - 7.8 % BASOPHILS 0 0.0 - 2.0 % IMMATURE GRANULOCYTES 0 0.0 - 5.0 %  
 ABS. NEUTROPHILS 4.6 1.7 - 8.2 K/UL  
 ABS. LYMPHOCYTES 0.9 0.5 - 4.6 K/UL  
 ABS. MONOCYTES 0.8 0.1 - 1.3 K/UL  
 ABS. EOSINOPHILS 0.1 0.0 - 0.8 K/UL  
 ABS. BASOPHILS 0.0 0.0 - 0.2 K/UL  
 ABS. IMM. GRANS. 0.0 0.0 - 0.5 K/UL METABOLIC PANEL, BASIC Collection Time: 07/10/18  5:55 AM  
Result Value Ref Range Sodium 144 136 - 145 mmol/L Potassium 3.3 (L) 3.5 - 5.1 mmol/L Chloride 108 (H) 98 - 107 mmol/L  
 CO2 27 21 - 32 mmol/L Anion gap 9 7 - 16 mmol/L Glucose 82 65 - 100 mg/dL BUN 4 (L) 6 - 23 MG/DL Creatinine 0.93 0.8 - 1.5 MG/DL  
 GFR est AA >60 >60 ml/min/1.73m2 GFR est non-AA >60 >60 ml/min/1.73m2 Calcium 7.5 (L) 8.3 - 10.4 MG/DL  
TROPONIN I Collection Time: 07/10/18  5:55 AM  
Result Value Ref Range Troponin-I, Qt. <0.02 (L) 0.02 - 0.05 NG/ML  
GLUCOSE, POC Collection Time: 07/10/18  7:23 AM  
Result Value Ref Range Glucose (POC) 99 65 - 100 mg/dL GLUCOSE, POC Collection Time: 07/10/18 11:08 AM  
Result Value Ref Range Glucose (POC) 97 65 - 100 mg/dL TROPONIN I Collection Time: 07/10/18 11:17 AM  
Result Value Ref Range Troponin-I, Qt. <0.02 (L) 0.02 - 0.05 NG/ML All Micro Results Procedure Component Value Units Date/Time CULTURE, STOOL [[id number]]  (Abnormal) Collected:  07/07/18 0845 Order Status:  Completed Specimen:  Stool Updated:  07/10/18 0720 Special Requests: NO SPECIAL REQUESTS Culture result:      
  NO GROWTH OF SALMONELLA OR SHIGELLA IS EVIDENT ON FIRST READING, FINAL REPORT TO FOLLOW AFTER FURTHER INCUBATION OF CULTURE (A) CULTURE IN PROGRESS,FURTHER UPDATES TO FOLLOW CULTURE, BLOOD [[id number]] Collected:  07/08/18 0604  Order Status:  Completed Specimen:  Blood from Blood Updated:  07/10/18 0720 Special Requests: --     
  RIGHT 
PORT Culture result: NO GROWTH 2 DAYS     
 CULTURE, BLOOD [[id number]] Collected:  07/08/18 9869 Order Status:  Completed Specimen:  Blood from Blood Updated:  07/10/18 0720 Special Requests: --     
  RIGHT 
HAND Culture result: NO GROWTH 2 DAYS Michaela Pagan [[id number]] Collected:  07/07/18 0870 Order Status:  Completed Specimen:  Stool Updated:  07/08/18 1433 Special Requests: NO SPECIAL REQUESTS Culture result: NEGATIVE Toxigenic C. diff NEGATIVE/ 027-NAP1-BI PRESUMPTIVE NEGATIVE  
 C. DIFFICILE/EPI PCR [[id number]] Order Status:  Canceled Specimen:  Stool Current Meds: 
Current Facility-Administered Medications Medication Dose Route Frequency  bisacodyl (DULCOLAX) tablet 10 mg  10 mg Oral NOW  amLODIPine (NORVASC) tablet 5 mg  5 mg Oral DAILY  polyethylene glycol (MIRALAX) powder 255 g  255 g Oral ONCE  ciprofloxacin (CIPRO) 400 mg IVPB (premix)  400 mg IntraVENous Q12H  
 metroNIDAZOLE (FLAGYL) IVPB premix 500 mg  500 mg IntraVENous Q8H  
 nitroglycerin (NITROSTAT) tablet 0.4 mg  0.4 mg SubLINGual PRN  
 sodium chloride (NS) flush 5-10 mL  5-10 mL IntraVENous Q8H  
 sodium chloride (NS) flush 5-10 mL  5-10 mL IntraVENous PRN  
 acetaminophen (TYLENOL) tablet 650 mg  650 mg Oral Q4H PRN  
 naloxone (NARCAN) injection 0.4 mg  0.4 mg IntraVENous PRN  
 diphenhydrAMINE (BENADRYL) capsule 25 mg  25 mg Oral Q4H PRN  prochlorperazine (COMPAZINE) injection 5 mg  5 mg IntraVENous Q8H PRN  
 albuterol (PROVENTIL VENTOLIN) nebulizer solution 2.5 mg  2.5 mg Nebulization Q4H PRN  
 0.9% sodium chloride infusion 250 mL  250 mL IntraVENous PRN  
 famotidine (PF) (PEPCID) 20 mg in sodium chloride 0.9% 10 mL injection  20 mg IntraVENous Q12H  
 metoprolol tartrate (LOPRESSOR) tablet 50 mg  50 mg Oral Q12H  furosemide (LASIX) tablet 40 mg  40 mg Oral DAILY  hydrALAZINE (APRESOLINE) 20 mg/mL injection 10 mg  10 mg IntraVENous Q6H PRN  
 HYDROcodone-acetaminophen (NORCO)  mg tablet 1 Tab  1 Tab Oral Q6H PRN  
 albuterol-ipratropium (DUO-NEB) 2.5 MG-0.5 MG/3 ML  3 mL Nebulization T9X RT  
 folic acid (FOLVITE) tablet 1 mg  1 mg Oral DAILY  clonazePAM (KlonoPIN) tablet 0.5 mg  0.5 mg Oral BID PRN  
 insulin lispro (HUMALOG) injection   SubCUTAneous AC&HS  budesonide (PULMICORT) 500 mcg/2 ml nebulizer suspension  500 mcg Nebulization BID RT  
 guaiFENesin ER (MUCINEX) tablet 600 mg  600 mg Oral Q12H Other Studies (last 24 hours): No results found. Assessment and Plan:  
 
Hospital Problems as of 7/10/2018  Date Reviewed: 7/7/2018 Codes Class Noted - Resolved POA Hypoalbuminemia (Chronic) ICD-10-CM: E88.09 
ICD-9-CM: 273.8  7/7/2018 - Present Yes Overview Signed 7/7/2018  6:22 AM by Alan Eaton MD  
  Probably due to malnutrition Gastric cancer ([facility] ICD-10-CM: C16.9 ICD-9-CM: 151.9  7/7/2018 - Present Unknown COPD (chronic obstructive pulmonary disease) (HCC) ICD-10-CM: J44.9 ICD-9-CM: 974  7/7/2018 - Present Unknown SHANNON on CPAP ICD-10-CM: G47.33, Z99.89 ICD-9-CM: 327.23, V46.8  7/7/2018 - Present Unknown Hypernatremia ICD-10-CM: E87.0 ICD-9-CM: 276.0  7/7/2018 - Present Yes CAD (coronary artery disease) ICD-10-CM: I25.10 ICD-9-CM: 414.00  7/7/2018 - Present Unknown Hx of CABG ICD-10-CM: Z95.1 ICD-9-CM: V45.81  7/7/2018 - Present Unknown * (Principal)Pancolitis ([facility] ICD-10-CM: K51.00 ICD-9-CM: 556.6  7/6/2018 - Present Yes  
   
 N&V (nausea and vomiting) ICD-10-CM: R11.2 ICD-9-CM: 787.01  7/6/2018 - Present Yes Essential hypertension (Chronic) ICD-10-CM: I10 
ICD-9-CM: 401.9  7/6/2018 - Present Yes Panlobular emphysema (HCC) (Chronic) ICD-10-CM: J43.1 ICD-9-CM: 492.8  7/6/2018 - Present Yes Anemia (Chronic) ICD-10-CM: D64.9 ICD-9-CM: 285.9  7/6/2018 - Present Yes Chronic pulmonary embolism (HCC) (Chronic) ICD-10-CM: G37.15 
ICD-9-CM: 416.2  7/6/2018 - Present Yes Overview Signed 7/6/2018 11:17 PM by Mikayla Urban MD  
  Dx early June 2018; on Xarelto PLAN:   
· Pancolitis: continue cipro/flagyl, appreciate GI for pending scopes 7-11 · CAD: held plavix due to planned endoscopy, troponin negative x 3 and complaints resolved, low risk stress testing at [facility] · Bilateral DVT/PE: held xarelto for scopes and due to Acute anemia, consult IR for IVC filter · Anemia: as above, trend HGB, stable · HTN: continued home meds · Hypokalemia: replace and followup BMP · DM2: sliding scale insulin · Hypernatremia: resolved · COPD:stable DC planning/Dispo:  Home at discharge Diet:  DIET NUTRITIONAL SUPPLEMENTS 
DIET NUTRITIONAL SUPPLEMENTS 
DIET CLEAR LIQUID 
DIET NPO 
DVT ppx:  SCD contraindicated with subacute DVT/ no anticoagulation with anemia Signed: Rajesh Munoz MD

## 2018-07-10 NOTE — ADT AUTH CERT NOTES
LOC:Acute Adult-General Medical (7/10/2018) by Justin Gardiner RN        Review Status Review Entered       In Primary 7/10/2018       Details         REVIEW SUMMARY     Patient: Daniel Medrano  Review Number: [id number]  Review Status: In Primary     Condition Specific: Yes     Condition Level Of Care Code: ACUTE  Condition Level Of Care Description: Acute        OUTCOMES  Outcome Type: Primary           REVIEW DETAILS     Service Date: 07/10/2018  Admit Date: 07/06/2018  Product: Charles Ivans Adult  Subset: General Medical      (Symptom or finding within 24h)         (Excludes PO medications unless noted)          [X] Select Day, One:              [X] Episode Day 3-X, One:                  [X] ACUTE, >= One:                      [X] Gastrointestinal or biliary, One:                          [X] Partial responder, not clinically stable for discharge and requires continued stay, >= One:                              [X] Dehydration or gastroenteritis, Both:                                  [X] Finding, >= One:                                      [X] Inadequate oral intake <= 4d                                  [X] Intervention, One:                                      [X] Advancing diet as tolerated <= 2d                                      ~--Admin, IQ Admin Admin on 07- 12:30 PM--~                                      Clear Liquid Diet, NPO after MN for colonoscopy in AM                  Version: "[employer] 2017.2  Pranay Arias  © 2017 [employer] and/or one of its [place]. All Rights Reserved. CPT only © 2016 American [employer] Association. All Rights Reserved.              Additional Notes       Clinical Date Reviewed:   7/10/18              LOS; Status; Review Type:  Med/Inpt/CS              Vital Signs:  168/84, 98.1, 68, 18, 94%RA              Abn. Findings LABS/RADIOLOGY:       H&H 12.1/34.6, PLT 92, K+ 3.3, Chl 108, BUN 4, Trent 7.5, Trop <0.02              Meds:       Duo-Neb 2.5-0.5mg IN Q6hrs       Norvasc 5mg PO Daily       Pulmicort 500mcg IN BID       Pepcid 20mg IV q12hrs       Folvite 1mg PO Daily        Lasix 40mg PO Daily        Mucinex 600mg PO Q12hrs       Norco 10-325mg PO Q6hrs PRN X1 ([id number])       Humalog SSI SC 4X Daily        Lopressor 50mg PO Q12hrs       Cipro 400mg IV Q12hrs       Flagyl 500mg IV Q8hrs       Dulcolax 10mg PO       Miralax 255g PO        Dulcolax 10mg RE       Mag Citrate 296mL PO       Klor-Con 40mEq PO               Gastroenterology Progress Notes Date of Service: 07/10/18 5479              CC:  Anemia with heme positive stool               Subjective:               Patient: Patient did not get his bowel prep yesterday. Denies any abdominal pain, N&V, hematochezia today or yesterday.  Had 2 BM yesterday.               Assessment:               Principal Problem:         Pancolitis ([address] (7/6/2018)               Active Problems:         N&V (nausea and vomiting) (7/6/2018)                 Essential hypertension (7/6/2018)                 Panlobular emphysema ([address] (7/6/2018)                 Anemia (7/6/2018)                 Chronic pulmonary embolism ([address] (7/6/2018)           Overview: Dx early June 2018; on Xarelto                 Hypoalbuminemia (7/7/2018)           Overview: Probably due to malnutrition                 Gastric cancer ([address] (7/7/2018)                 COPD (chronic obstructive pulmonary disease) ([facility] [address] (7/7/2018)                 SHANNON on CPAP (7/7/2018)                 Hypernatremia (7/7/2018)                 CAD (coronary artery disease) (7/7/2018)                 Hx of CABG (7/7/2018)               62 y.o. male with PMH of (but not limited to) CAD on Plavix and Xarelto (last known dose 7/6), chronic pulmonary embolism, Gastric cancer with gastrectomy 2016, COPD oxygen dependent, HTN, Seizure disorder, Bipolar and Schizoaffective disorder, chronic anemia followed by hematology, and history of colon polyps, who we are following for anemia and heme positive stool. Mr. Solitario Allen is a known patient to Dr. Dwight Pollard with history of gastric cancer in 2016 s/p gastrectomy. His current CEA is elevated at 15.3 on 7/9. He has known colon polyps and was scheduled for recent colonoscopy which the patient cancelled. His anemia could be secondary to friable polyp/mass/ possible IBD with recent pancolitis and hypoalbuminemia. Recurrent gastric cancer is a potential as well. He was supposed to received a 2 day bowel prep, but did not get his bowel prep on 7/9 as ordered despite specific instructions.               Plan:               1. Magnesium citrate 1 bottle 10oz NOW       2. Dulcolax supp 10mg one pr NOW       3. Dulcolax po 10mg one po at 1600       4. Miralax bowel cleanse at 1700 and 2100       5. NPO after midnight       6. Clear liquid diet nothing red today       7. Plan for diagnostic EGD and colonoscopy tomorrow with Dr. Samir Smalls 7/11/18. If the patient is not cleaned out (since he was not given his prep on 7/09 as directed), his procedures may be delayed to 7/12.           LOC:Acute Adult-General Medical (7/9/2018) by Edgar Grimm RN        Review Status Review Entered       In Primary 7/9/2018       Details         REVIEW SUMMARY     Patient: Mirtha Alvarez  Review Number: [id number]  Review Status:  In Primary     Condition Specific: Yes     Condition Level Of Care Code: ACUTE  Condition Level Of Care Description: Acute        OUTCOMES  Outcome Type: Primary           REVIEW DETAILS     Service Date: 07/09/2018  Admit Date: 07/06/2018  Product: [employer] Adult  Subset: General Medical      (Symptom or finding within 24h)         (Excludes PO medications unless noted)          [X] Select Day, One:              [X] Episode Day 3-X, One:                  [X] ACUTE, >= One:                      [X] Gastrointestinal or biliary, One:                          [X] Partial responder, not clinically stable for discharge and requires continued stay, >= One:                              [X] Dehydration or gastroenteritis, Both:                                  [X] Finding, >= One:                                      [X] Active vomiting or diarrhea                                      ~--Admin, IQ Admin Admin on 07- 01:42 PM--~                                      C/O watery Stools                                               [X] Intervention, One:                                      [X] Advancing diet as tolerated <= 2d                                      ~--Admin, IQ Admin Admin on 07- 01:41 PM--~                                      GI Soft Diet                  Version: [employer] 2017.2  Katie Jones  © 2017 [employer] and/or one of its [place]. All Rights Reserved. CPT only © 2016 American Medical Association. All Rights Reserved.              Additional Notes       Clinical Date Reviewed:   7/9/18              LOS; Status; Review Type:  Med/Inpt/CS              Vital Signs:  164/74, 98.5, 61, 18, 96%RA              Abn. Findings LABS/RADIOLOGY:              Meds:       D5 75mL hr IV        Duo-Neb 2.5-0.5mg IN Q6hrs       Norvasc 5mg PO Daily       Pulmicort 500mcg IN BID       Pepcid 20mg IV q12hrs       Folvite 1mg PO Daily        Lasix 40mg PO Daily        Mucinex 600mg PO Q12hrs       Humalog SSI SC 4X Daily        Lopressor 50mg PO Q12hrs       Cipro 400mg IV Q12hrs       Flagyl 500mg IV Q8hrs              Gastroenterology Consults Date of Service: 07/09/18 1004              Chief Complaint: Leslie Ocampo with Heme positive stool               Subjective:               History of Present Illness:  Patient is a 62 y.o. male with PMH of (but not limited to) CAD on Plavix and Xarelto (last known dose 7/6), chronic pulmonary embolism, Gastric cancer with gastrectomy 2016, COPD oxygen dependent, HTN, Seizure disorder, Bipolar and Schizoaffective disorder, chronic anemia followed by hematology, and history of colon polyps, who is seen in consultation at the request of Dr. Sally Eddy for anemia and heme positive stool. Mr. Silva Suarez is a known patient to Dr. Juventino Goldberg who was last evaluated in the office on 5/2018. At that time, he had abnormal LFTS which were concerning for possible metastasis and CT scan was ordered. This was done at [address] 6/30/18 and revealed pancolitis. He was hospitalized there and treated with Ciprofloxacin and Flagyl. He continued to have intermittent diarrhea, abdominal pain and N&V. He was then admitted to [facility] on 7/6/18. He has continued on Ciprofloxacin and Flagyl with improvement of his symptoms. We have been asked to see him for his anemia and heme positive stool. He has had some intermittent BRRB over the last 2 days. He had a previous Colonoscopy by Dr. Juventino Goldberg 9/8/17 which revealed a suboptimal prep, diverticular disease, and 2 colon polyps. 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 Recommend repeat surveillance in 6 mos with 2 day prep. This was rescheduled at his last ov, but the patient called and cancelled this.               Labs: 7/9/18 WBC 4.5, Hgb 7.4, Hct 21.2, MCV 94.1, RDW 19.6, plt 60, BUN 4, creat 1.13, T bili 0.4, ALT 35, AST 37, , albumin 1.9. 7/7/18 C. Difficile and culture negative               EGD 9/7/17 by Dr. Juventino Goldberg revealed s/p gastrectomy. He had a gastric mass on EGD 2016 that was positive for poorly differentiated ringlet cell cancer.  He is s/p gastrectomy.               Assessment:               Principal Problem:         Pancolitis ([address] (7/6/2018)               Active Problems:         N&V (nausea and vomiting) (7/6/2018)                 Essential hypertension (7/6/2018)                 Panlobular emphysema ([address] (7/6/2018)                 Anemia (7/6/2018)                 Chronic pulmonary embolism ([address] (7/6/2018)           Overview: Dx early June 2018; on Xarelto                 Hypoalbuminemia (7/7/2018)           Overview: Probably due to malnutrition                 Gastric cancer ([facility] [address] (7/7/2018)                 COPD (chronic obstructive pulmonary disease) ([facility] [address] (7/7/2018)                 SHANNON on CPAP (7/7/2018)                 Hypernatremia (7/7/2018)                 CAD (coronary artery disease) (7/7/2018)                 Hx of CABG (7/7/2018)               62 y.o. male with PMH of (but not limited to) CAD on Plavix and Xarelto (last known dose 7/6), chronic pulmonary embolism, Gastric cancer with gastrectomy 2016, COPD oxygen dependent, HTN, Seizure disorder, Bipolar and Schizoaffective disorder, chronic anemia followed by hematology, and history of colon polyps, who is seen in consultation at the request of Dr. Gopal Nieves for anemia and heme positive stool. Mr. Dipak Cedeno is a known patient to Dr. Jaden Peterson with history of gastric cancer in 2016 s/p gastrectomy. He has known colon polyps and was scheduled for recent colonoscopy which the patient cancelled. His anemia could be secondary to friable polyp/mass/ possible IBD with recent pancolitis. Recurrent gastric cancer is a potential as well.                Plan:               1. Schedule EGD and colonoscopy with a 2 day prep and once off Plavix/Xarelto for 5 days (last dose 7/6). We will plan on this for Wednesday 7/11. Risks and benefits were discussed with the patient to include risk of infection, bleeding, perforation, anesthesia, missed colon polyps and possible mortality. He verbalized understanding and wishes to proceed with procedures.       2. Clear liquid diet on 7/10       3. NPO after midnight 7/10 for procedure 7/11       4. Continue to hold Plavix/Xarelto       5. CEA       6. Agree with ciprofloxacin and Flagyl               Additional Comments:       Given hx of colon polyps with HGD and poor follow-up as outpatient and heme positive anemia with lower gi symptoms patient needs colonoscopy with 2 day prep here with holding of anti-coagulation.       In addition given hx of gastric ca will also perform an EGD to rule out anastomotic ulcers.  Based on surgical anatomy malabsorption is also a consideration given elevated MCV.                 Plan d/w patient and daughter at bedside and encouraged him to prep adequately.               Glory Borders is likely etiology of colitis findings.  Check PT, INR prior to procedures as well.  Also check CEA given hx of poorly differentiated malignancy.           LOC:Acute Adult-General Medical (7/8/2018) by Erin Travis RN        Review Status Review Entered       In Primary 7/9/2018       Details         REVIEW SUMMARY     Patient: [address]  Review Number: [id number]  Review Status:  In Primary     Condition Specific: Yes     Condition Level Of Care Code: ACUTE  Condition Level Of Care Description: Acute        OUTCOMES  Outcome Type: Primary           REVIEW DETAILS     Service Date: 07/08/2018  Admit Date: 07/06/2018  Product: Nirav Stake Adult  Subset: General Medical      (Symptom or finding within 24h)         (Excludes PO medications unless noted)          [X] Select Day, One:              [X] Episode Day 3-X, One:                  [X] ACUTE, >= One:                      [X] Gastrointestinal or biliary, One:                          [X] Partial responder, not clinically stable for discharge and requires continued stay, >= One:                              [X] Dehydration or gastroenteritis, Both:                                  [X] Finding, >= One:                                      [X] Active vomiting or diarrhea                                      ~--Admin, IQ Admin Admin on 07- 01:40 PM--~                                      He expressed having watery diarrhea still.                                               [X] Intervention, One:                                      [X] Advancing diet as tolerated <= 2d                                      ~--Admin, IQ Admin Admin on 07- 01:40 PM--~ Remains on clear Liquid Diet                  Version: [employer]® 2017.2  Sen Laureano  © 2017 [employer] and/or one of its [place]. All Rights Reserved. CPT only © 2016 American Medical Association. All Rights Reserved.              Additional Notes       Clinical Date Reviewed:   7/8/18              LOS; Status; Review Type:  Med/Inpt/CS              Vital Signs:  154/87, 98.3, 78, 18, 92%RA              Abn. Findings LABS/RADIOLOGY:              Meds:       D5 75mL hr IV        Duo-Neb 2.5-0.5mg IN Q6hrs       Pulmicort 500mcg IN BID       Pepcid 20mg IV q12hrs       Folvite 1mg PO Daily        Lasix 40mg PO Daily        Mucinex 600mg PO Q12hrs       Norco 10-325mg PO Q6hrs PRN X1 ([id number])       Humalog SSI SC 4X Daily        Lopressor 50mg PO Q12hrs       Compazine 5mg IV Q8hrs PRN X1       Cipro 400mg IV Q12hrs       Flagyl 500mg IV Q8hrs                             Internal Medicine Progress Notes Date of Service: 07/08/18 9668              Subjective:       Cc: \" I feel weak\"               Patient examined at bedside. He was noted in no distress, but stated feeling weak. He received 2 prbc and his hb is 11gr. Had positive occult blood in stool. C diff negative.  He expressed having watery diarrhea still.               Assessment:               Principal Problem:         Pancolitis ([address] (7/6/2018)               Active Problems:         N&V (nausea and vomiting) (7/6/2018)                 Essential hypertension (7/6/2018)                 Panlobular emphysema ([address] (7/6/2018)                 Anemia (7/6/2018)                 Chronic pulmonary embolism ([address] (7/6/2018)           Overview: Dx early June 2018; on Xarelto                 Hypoalbuminemia (7/7/2018)           Overview: Probably due to malnutrition                 Gastric cancer ([address] (7/7/2018)                 COPD (chronic obstructive pulmonary disease) ([address] (7/7/2018)                 SHANNON on CPAP (7/7/2018)                 Hypernatremia (7/7/2018)                 CAD (coronary artery disease) (7/7/2018)                 Hx of CABG (7/7/2018)                               Plan:       -Subacute diarrhea, nausea and vomiting, in the setting on pancolitis on an abdomen CT scan from 6/30/18: he has watery diarrhea, but C diff is negative - continue IVF hydration - cipro and flagyl , zofran- monitor chemistry daily - GI consult tomorrow                -Hypernatremia: possible due to dehydration /GI losses: improving- continue D5 gtt - monitor chemistry                 -History of chronic anemia: has positive occult blood test in stool: HB 11 gr, s/p 2 prbc - he has recent iron, vit b12, folate levels done at [facility] on 7/2/18 ( iron 78, tibc 89, trnasf % 71, vitamin b 12 > 2000, folate 10, ferritin 221 ) - monitor HH daily - GI evaluation tomorrow                -History of DVT/PE: on xarelto, but currently held in view of anemia, and anticipating possible colonoscopy if his clinical status does not improve               -CAD, CABG: no chest pain, normal EF > 55% on recent echo, plavix held in light of anemia and anticipating possible colonoscopy - continue NTG, home meds               -COPD, mild exacerbation: duonebs - home prn - pulmicort - mucinex               -Essential hypertension: resume lopressor and lasix for now, hold lisinopril                 -SHANNON, on cpap               -Hypoalbuminemia, caloric malnutrition / pedal edema: on lasix - nutrition consult               -History of falls and debility: PT/OT- patient uses wheelchair at home due to this findings               DVT ppx: ambulation               Full code               Disposition: home with home PT versus STR           LOC:Acute Adult-General Medical (7/7/2018) by Rey Al RN        Review Status Review Entered       In Primary 7/9/2018       Details         REVIEW SUMMARY     Patient: [address]  Review Number: [id number]  Review Status: In Primary     Condition Specific: Yes     Condition Level Of Care Code: ACUTE  Condition Level Of Care Description: Acute        OUTCOMES  Outcome Type: Primary           REVIEW DETAILS     Service Date: 07/07/2018  Admit Date: 07/06/2018  Product: Deborah Rivas Adult  Subset: General Medical      (Symptom or finding within 24h)         (Excludes PO medications unless noted)          [X] Select Day, One:              [X] Episode Day 2,  One:                  [X] ACUTE, >= One:                      [X] Gastrointestinal or biliary, One:                          [X] Partial responder, not clinically stable for discharge and requires continued stay, >= One:                              [X] Dehydration or gastroenteritis, Both:                                  [X] Finding, >= One:                                      [X] Active vomiting or diarrhea                                      ~--Admin, IQ Admin Admin on 07- 01:35 PM--~                                      He experiences watery diarrhea > 10-25 times daily                                              [X] Intervention, >= One:                                      [X] Advancing diet as tolerated <= 2d                                      ~--Admin, IQ Admin Admin on 07- 01:35 PM--~                                      Clear Liquid                                                   [ ] IV fluid, One:                                          [ ] >= 75 mL/h, >= One:                                          ~--Admin, IQ Admin Admin on 07- 01:35 PM--~                                          D5 75mL hr                  Version: [employer]® 2017.2  Nyasia Saenz  © 2017 [employer] and/or one of its [place]. All Rights Reserved. CPT only © 2016 American Medical Association. All Rights Reserved.              Additional Notes       Clinical Date Reviewed:   7/7/18              LOS; Status;  Review Type:  Med/Inpt/CS              Vital Signs:  158/78, 97.4, 56, 20, 100%RA              Abn. Findings LABS/RADIOLOGY:              Meds:       Duo-Neb 2.5-0.5mg IN Q6hrs       Pulmicort 500mcg IN BID       D5 75mL hr IV        Pepcid 20mg IV q12hrs       Folvite 1mg PO Daily        Lasix 40mg PO Daily        Mucinex 600mg PO Q12hrs       Norco 10-325mg PO Q6hrs PRN X1       Humalog SSI SC 4X Daily        Lopressor 50mg PO Q12hrs       Compazine 5mg IV Q8hrs PRN X1       Lasix 20mg iV       Heparin 5000u IV       NS IV 1000mL        Cipro 400mg IV Q12hrs       Flagyl 500mg IV Q8hrs       Morphine IR 15mg PO Q4hrs PRN X1              Internal Medicine Progress Notes Date of Service: 07/07/18 5599                      Subjective:       Cc: \" I feel nauseous\"               Patient examined at bedside. He was found alert, able to speak in full sentences. He stated feeling with nausea, was able to tolerate his breakfast without vomiting. He was admitted overnight for colitis and persisting vomiting and diarrhea. Currently on iv cipro and flagyl. On further questioning, he stated having vomiting, nausea, abdominal pain and diarrhea for around 2 weeks. He visited [address] on 6/30/18 and after a CT scan with a diagnosis of pancolitis, he was DC on cipro and flagyl. Since then his symptoms are not any better. He experiences watery diarrhea > 10-25 times daily. I explained need to continue antibiotics for now. I'll check for C diff in this scenario.  Also, he was found to have a diagnosis of DVT/PE on 5/31/18 and is on xarelto since then.                ROS: denies fever, chills, chest pain, sob.               Assessment:               Principal Problem:         Pancolitis ([facility] [address] (7/6/2018)               Active Problems:         N&V (nausea and vomiting) (7/6/2018)                 Essential hypertension (7/6/2018)                 Panlobular emphysema ([facility] [address] (7/6/2018)                 Anemia (7/6/2018)                 Chronic pulmonary embolism ([facility] [address] (7/6/2018)           Overview: Dx early June 2018; on Xarelto                 Hypoalbuminemia (7/7/2018)           Overview: Probably due to malnutrition                               Plan:       -Subacute diarrhea, nausea and vomiting, in the setting on pancolitis on an abdomen CT scan from 6/30/18: he has watery diarrhea, so C diff has to be ruled out - continue IVF hydration - cipro and flagyl for now, zofran- monitor chemistry daily - check blood cultures                -Hypernatremia: possible due to dehydration /GI losses: start D5 gtt - monitor chemistry                 -History of chronic anemia: no hx of overt GI bleeding at the moment: HB 7gr, in view of his cardiac history ( CAD, CABG ), he will get transfused 2 prbc- give lasix in between transfusions - monitor HH               -History of DVT/PE: on xarelto, but currently held in view of anemia, and anticipating possible colonoscopy if his clinical status does not improve               -CAD, CABG: no chest pain, normal EF > 55% on recent echo, plavix held in light of anemia and anticipating possible colonoscopy - continue NTG, home meds               -COPD, mild exacerbation: duonebs - home prn - pulmicort - mucinex               -Essential hypertension: resume lopressor and lasix for now, hold lisinopril                 -SHANNON, on cpap               -Hypoalbuminemia, caloric malnutrition / pedal edema: on lasix - nutrition consult               -History of falls and debility: PT/OT- patient uses wheelchair at home due to this findings/ place PPD                DVT ppx: ambulation               Full code               Disposition: home with home PT versus STR

## 2018-07-10 NOTE — PROGRESS NOTES
Pt walking down the escobedo with daughter and appeared very pleasant and reported to the nurse that his stomach cramp is feeling better. Nurse encouraged nurse to call if stomach pain gets any worse. Will continue to monitor.

## 2018-07-10 NOTE — PROGRESS NOTES
Interdisciplinary Rounds completed 07/10/18. Nursing, Case Management, Physician and PT present. Plan of care reviewed and updated.

## 2018-07-10 NOTE — PROGRESS NOTES
Mr. Ascencio Screen complained of chest pain that was 3/10 in intensity, w/o radiation, lasted 2-3 minutes and resolved with Nitro. Hx of significant cardiac pathology. Will get serial Trop I/ EKGs, cardiac monitoring. Consider echocardiogram and cardiology input in AM per primary provider discretion.       Bello Martin MD  07/09/18

## 2018-07-10 NOTE — PROGRESS NOTES
GI DAILY PROGRESS NOTE    Admit Date:  7/6/2018    Today's Date:  7/10/2018    CC:  Anemia with heme positive stool    Subjective:     Patient: Patient did not get his bowel prep yesterday. Denies any abdominal pain, N&V, hematochezia today or yesterday. Had 2 BM yesterday.      Medications:   Current Facility-Administered Medications   Medication Dose Route Frequency    magnesium citrate solution 296 mL  296 mL Oral NOW    bisacodyl (DULCOLAX) tablet 10 mg  10 mg Oral NOW    amLODIPine (NORVASC) tablet 5 mg  5 mg Oral DAILY    polyethylene glycol (MIRALAX) powder 255 g  255 g Oral ONCE    ciprofloxacin (CIPRO) 400 mg IVPB (premix)  400 mg IntraVENous Q12H    metroNIDAZOLE (FLAGYL) IVPB premix 500 mg  500 mg IntraVENous Q8H    nitroglycerin (NITROSTAT) tablet 0.4 mg  0.4 mg SubLINGual PRN    sodium chloride (NS) flush 5-10 mL  5-10 mL IntraVENous Q8H    sodium chloride (NS) flush 5-10 mL  5-10 mL IntraVENous PRN    acetaminophen (TYLENOL) tablet 650 mg  650 mg Oral Q4H PRN    naloxone (NARCAN) injection 0.4 mg  0.4 mg IntraVENous PRN    diphenhydrAMINE (BENADRYL) capsule 25 mg  25 mg Oral Q4H PRN    prochlorperazine (COMPAZINE) injection 5 mg  5 mg IntraVENous Q8H PRN    albuterol (PROVENTIL VENTOLIN) nebulizer solution 2.5 mg  2.5 mg Nebulization Q4H PRN    0.9% sodium chloride infusion 250 mL  250 mL IntraVENous PRN    famotidine (PF) (PEPCID) 20 mg in sodium chloride 0.9% 10 mL injection  20 mg IntraVENous Q12H    metoprolol tartrate (LOPRESSOR) tablet 50 mg  50 mg Oral Q12H    furosemide (LASIX) tablet 40 mg  40 mg Oral DAILY    hydrALAZINE (APRESOLINE) 20 mg/mL injection 10 mg  10 mg IntraVENous Q6H PRN    HYDROcodone-acetaminophen (NORCO)  mg tablet 1 Tab  1 Tab Oral Q6H PRN    albuterol-ipratropium (DUO-NEB) 2.5 MG-0.5 MG/3 ML  3 mL Nebulization E7O RT    folic acid (FOLVITE) tablet 1 mg  1 mg Oral DAILY    clonazePAM (KlonoPIN) tablet 0.5 mg  0.5 mg Oral BID PRN    insulin lispro (HUMALOG) injection   SubCUTAneous AC&HS    budesonide (PULMICORT) 500 mcg/2 ml nebulizer suspension  500 mcg Nebulization BID RT    guaiFENesin ER (MUCINEX) tablet 600 mg  600 mg Oral Q12H       Review of Systems:  ROS was obtained, with pertinent positives as listed above. No chest pain or SOB. Diet:  Clear liquid    Objective:   Vitals:  Visit Vitals    /85 (BP 1 Location: Right arm, BP Patient Position: At rest)    Pulse 68    Temp 98.1 °F (36.7 °C)    Resp 18    Ht 5' 8\" (1.727 m)    Wt 75.8 kg (167 lb)    SpO2 94%    BMI 25.39 kg/m2     Intake/Output:  07/10 0701 - 07/10 1900  In: 240 [P.O.:240]  Out: -   07/08 1901 - 07/10 0700  In: -   Out: 5400 [Urine:5400]  Exam:  General appearance: alert, cooperative, no distress FAMILY AT BEDSIDE; WATCHING TV  Lungs: clear to auscultation bilaterally anteriorly  Heart: regular rate and rhythm  Abdomen: soft, non-tender.  Bowel sounds normal. No masses, no organomegaly  Extremities: extremities normal, atraumatic, no cyanosis or edema  Neuro:  alert and oriented X4    Data Review (Labs):    Recent Labs      07/10/18   0555  07/09/18   0635  07/09/18   0543  07/09/18   0519  07/08/18   0558   WBC  6.4  6.6  4.5   --   6.6   HGB  12.1*  11.2*  7.4*   --   11.1*   HCT  34.6*  31.6*  21.2*   --   32.2*   PLT  92*  97*  60*   --   100*   MCV  94.8  94.0  94.1   --   94.4   NA  144   --    --   145  147*   K  3.3*   --    --   3.5  3.8   CL  108*   --    --   108*  111*   CO2  27   --    --   30  27   BUN  4*   --    --   4*  6   CREA  0.93   --    --   1.13  1.06   CA  7.5*   --    --   7.4*  7.3*   GLU  82   --    --   82  69   AP   --    --    --   107   --    SGOT   --    --    --   37   --    ALT   --    --    --   35   --    TBILI   --    --    --   0.4   --    CBIL   --    --    --   0.1   --    ALB   --    --    --   1.9*   --    TP   --    --    --   4.4*   --    PTP  13.5   --    --    --    --    INR  1.1   --    --    --    --        Assessment: Principal Problem:    Pancolitis ([address] (7/6/2018)    Active Problems:    N&V (nausea and vomiting) (7/6/2018)      Essential hypertension (7/6/2018)      Panlobular emphysema ([address] (7/6/2018)      Anemia (7/6/2018)      Chronic pulmonary embolism ([address] (7/6/2018)      Overview: Dx early June 2018; on Xarelto      Hypoalbuminemia (7/7/2018)      Overview: Probably due to malnutrition      Gastric cancer ([address] (7/7/2018)      COPD (chronic obstructive pulmonary disease) ([address] (7/7/2018)      SHANNON on CPAP (7/7/2018)      Hypernatremia (7/7/2018)      CAD (coronary artery disease) (7/7/2018)      Hx of CABG (7/7/2018)    62 y.o. male with PMH of (but not limited to) CAD on Plavix and Xarelto (last known dose 7/6), chronic pulmonary embolism, Gastric cancer with gastrectomy 2016, COPD oxygen dependent, HTN, Seizure disorder, Bipolar and Schizoaffective disorder, chronic anemia followed by hematology, and history of colon polyps, who we are following for anemia and heme positive stool. Mr. Lavonne Michelle is a known patient to Dr. Janes Pardo with history of gastric cancer in 2016 s/p gastrectomy. His current CEA is elevated at 15.3 on 7/9. He has known colon polyps and was scheduled for recent colonoscopy which the patient cancelled. His anemia could be secondary to friable polyp/mass/ possible IBD with recent pancolitis and hypoalbuminemia. Recurrent gastric cancer is a potential as well. He was supposed to received a 2 day bowel prep, but did not get his bowel prep on 7/9 as ordered despite specific instructions. Plan: 1. Magnesium citrate 1 bottle 10oz NOW  2. Dulcolax supp 10mg one pr NOW  3. Dulcolax po 10mg one po at 1600  4. Miralax bowel cleanse at 1700 and 2100  5. NPO after midnight  6. Clear liquid diet nothing red today  7. Plan for diagnostic EGD and colonoscopy tomorrow with Dr. Lora Yadav 7/11/18.  If the patient is not cleaned out (since he was not given his prep on 7/09 as directed), his procedures may be delayed to 7/12Yani Pastrana.  Oumou Pham in collaboration with Dr. Fernández Born  [facility]

## 2018-07-11 ENCOUNTER — ANESTHESIA (OUTPATIENT)
Dept: ENDOSCOPY | Age: 58
DRG: 245 | End: 2018-07-11
Payer: COMMERCIAL

## 2018-07-11 ENCOUNTER — APPOINTMENT (OUTPATIENT)
Dept: INTERVENTIONAL RADIOLOGY/VASCULAR | Age: 58
DRG: 245 | End: 2018-07-11
Attending: INTERNAL MEDICINE
Payer: COMMERCIAL

## 2018-07-11 LAB
ANION GAP SERPL CALC-SCNC: 7 MMOL/L (ref 7–16)
BASOPHILS # BLD: 0 K/UL (ref 0–0.2)
BASOPHILS NFR BLD: 1 % (ref 0–2)
BUN SERPL-MCNC: 3 MG/DL (ref 6–23)
CALCIUM SERPL-MCNC: 7.6 MG/DL (ref 8.3–10.4)
CHLORIDE SERPL-SCNC: 109 MMOL/L (ref 98–107)
CO2 SERPL-SCNC: 27 MMOL/L (ref 21–32)
CREAT SERPL-MCNC: 0.92 MG/DL (ref 0.8–1.5)
DIFFERENTIAL METHOD BLD: ABNORMAL
EOSINOPHIL # BLD: 0.1 K/UL (ref 0–0.8)
EOSINOPHIL NFR BLD: 1 % (ref 0.5–7.8)
ERYTHROCYTE [DISTWIDTH] IN BLOOD BY AUTOMATED COUNT: 18 % (ref 11.9–14.6)
GLUCOSE BLD STRIP.AUTO-MCNC: 107 MG/DL (ref 65–100)
GLUCOSE BLD STRIP.AUTO-MCNC: 116 MG/DL (ref 65–100)
GLUCOSE BLD STRIP.AUTO-MCNC: 134 MG/DL (ref 65–100)
GLUCOSE BLD STRIP.AUTO-MCNC: 66 MG/DL (ref 65–100)
GLUCOSE SERPL-MCNC: 135 MG/DL (ref 65–100)
HCT VFR BLD AUTO: 34.8 % (ref 41.1–50.3)
HGB BLD-MCNC: 12.2 G/DL (ref 13.6–17.2)
IMM GRANULOCYTES # BLD: 0 K/UL (ref 0–0.5)
IMM GRANULOCYTES NFR BLD AUTO: 0 % (ref 0–5)
LYMPHOCYTES # BLD: 1.2 K/UL (ref 0.5–4.6)
LYMPHOCYTES NFR BLD: 20 % (ref 13–44)
MCH RBC QN AUTO: 33.5 PG (ref 26.1–32.9)
MCHC RBC AUTO-ENTMCNC: 35.1 G/DL (ref 31.4–35)
MCV RBC AUTO: 95.6 FL (ref 79.6–97.8)
MONOCYTES # BLD: 0.6 K/UL (ref 0.1–1.3)
MONOCYTES NFR BLD: 10 % (ref 4–12)
NEUTS SEG # BLD: 4.1 K/UL (ref 1.7–8.2)
NEUTS SEG NFR BLD: 68 % (ref 43–78)
PLATELET # BLD AUTO: 97 K/UL (ref 150–450)
PMV BLD AUTO: 10.6 FL (ref 10.8–14.1)
POTASSIUM SERPL-SCNC: 3.3 MMOL/L (ref 3.5–5.1)
RBC # BLD AUTO: 3.64 M/UL (ref 4.23–5.67)
SODIUM SERPL-SCNC: 143 MMOL/L (ref 136–145)
VIT A SERPL-MCNC: 5 UG/DL (ref 33.1–100)
VITAMIN E/ALPHA-TOCOPHEROL: 8.5 MG/L (ref 7–25.1)
VITAMIN E/GAMMA-TOCOPHEROL: 1.1 MG/L (ref 0.5–5.5)
WBC # BLD AUTO: 6 K/UL (ref 4.3–11.1)

## 2018-07-11 PROCEDURE — 74011000250 HC RX REV CODE- 250: Performed by: RADIOLOGY

## 2018-07-11 PROCEDURE — 74011250636 HC RX REV CODE- 250/636: Performed by: INTERNAL MEDICINE

## 2018-07-11 PROCEDURE — 99153 MOD SED SAME PHYS/QHP EA: CPT

## 2018-07-11 PROCEDURE — 74011250636 HC RX REV CODE- 250/636

## 2018-07-11 PROCEDURE — 74011250637 HC RX REV CODE- 250/637: Performed by: NURSE PRACTITIONER

## 2018-07-11 PROCEDURE — 74011250636 HC RX REV CODE- 250/636: Performed by: PHYSICIAN ASSISTANT

## 2018-07-11 PROCEDURE — 74011250636 HC RX REV CODE- 250/636: Performed by: ANESTHESIOLOGY

## 2018-07-11 PROCEDURE — 0DBM8ZZ EXCISION OF DESCENDING COLON, VIA NATURAL OR ARTIFICIAL OPENING ENDOSCOPIC: ICD-10-PCS | Performed by: INTERNAL MEDICINE

## 2018-07-11 PROCEDURE — 85025 COMPLETE CBC W/AUTO DIFF WBC: CPT | Performed by: INTERNAL MEDICINE

## 2018-07-11 PROCEDURE — 0DJ08ZZ INSPECTION OF UPPER INTESTINAL TRACT, VIA NATURAL OR ARTIFICIAL OPENING ENDOSCOPIC: ICD-10-PCS | Performed by: INTERNAL MEDICINE

## 2018-07-11 PROCEDURE — 74011636320 HC RX REV CODE- 636/320: Performed by: RADIOLOGY

## 2018-07-11 PROCEDURE — 77030004629 HC CATH ANGI SZ AUR COOK -B

## 2018-07-11 PROCEDURE — 74011250637 HC RX REV CODE- 250/637: Performed by: INTERNAL MEDICINE

## 2018-07-11 PROCEDURE — 80048 BASIC METABOLIC PNL TOTAL CA: CPT | Performed by: INTERNAL MEDICINE

## 2018-07-11 PROCEDURE — 94760 N-INVAS EAR/PLS OXIMETRY 1: CPT

## 2018-07-11 PROCEDURE — C1880 VENA CAVA FILTER: HCPCS

## 2018-07-11 PROCEDURE — 74011000250 HC RX REV CODE- 250: Performed by: INTERNAL MEDICINE

## 2018-07-11 PROCEDURE — C1894 INTRO/SHEATH, NON-LASER: HCPCS

## 2018-07-11 PROCEDURE — 88305 TISSUE EXAM BY PATHOLOGIST: CPT | Performed by: INTERNAL MEDICINE

## 2018-07-11 PROCEDURE — 94640 AIRWAY INHALATION TREATMENT: CPT

## 2018-07-11 PROCEDURE — 74011250637 HC RX REV CODE- 250/637: Performed by: PHYSICIAN ASSISTANT

## 2018-07-11 PROCEDURE — C1769 GUIDE WIRE: HCPCS

## 2018-07-11 PROCEDURE — B5191ZZ FLUOROSCOPY OF INFERIOR VENA CAVA USING LOW OSMOLAR CONTRAST: ICD-10-PCS | Performed by: RADIOLOGY

## 2018-07-11 PROCEDURE — 37191 INS ENDOVAS VENA CAVA FILTR: CPT

## 2018-07-11 PROCEDURE — 76040000026: Performed by: INTERNAL MEDICINE

## 2018-07-11 PROCEDURE — 99152 MOD SED SAME PHYS/QHP 5/>YRS: CPT

## 2018-07-11 PROCEDURE — 74011250636 HC RX REV CODE- 250/636: Performed by: FAMILY MEDICINE

## 2018-07-11 PROCEDURE — 06H03DZ INSERTION OF INTRALUMINAL DEVICE INTO INFERIOR VENA CAVA, PERCUTANEOUS APPROACH: ICD-10-PCS | Performed by: RADIOLOGY

## 2018-07-11 PROCEDURE — 77030037400 HC ADH TISS HI VISC EXOFIN CHMP -B

## 2018-07-11 PROCEDURE — 76060000033 HC ANESTHESIA 1 TO 1.5 HR: Performed by: INTERNAL MEDICINE

## 2018-07-11 PROCEDURE — 82962 GLUCOSE BLOOD TEST: CPT

## 2018-07-11 PROCEDURE — 74011000250 HC RX REV CODE- 250

## 2018-07-11 PROCEDURE — 77030009426 HC FCPS BIOP ENDOSC BSC -B: Performed by: INTERNAL MEDICINE

## 2018-07-11 PROCEDURE — 74011250636 HC RX REV CODE- 250/636: Performed by: RADIOLOGY

## 2018-07-11 PROCEDURE — 65270000029 HC RM PRIVATE

## 2018-07-11 PROCEDURE — 36415 COLL VENOUS BLD VENIPUNCTURE: CPT | Performed by: INTERNAL MEDICINE

## 2018-07-11 RX ORDER — LIDOCAINE HYDROCHLORIDE 10 MG/ML
1-20 INJECTION INFILTRATION; PERINEURAL ONCE
Status: COMPLETED | OUTPATIENT
Start: 2018-07-11 | End: 2018-07-11

## 2018-07-11 RX ORDER — POTASSIUM CHLORIDE 20 MEQ/1
40 TABLET, EXTENDED RELEASE ORAL
Status: COMPLETED | OUTPATIENT
Start: 2018-07-11 | End: 2018-07-11

## 2018-07-11 RX ORDER — SODIUM CHLORIDE 0.9 % (FLUSH) 0.9 %
5-10 SYRINGE (ML) INJECTION AS NEEDED
Status: DISCONTINUED | OUTPATIENT
Start: 2018-07-11 | End: 2018-07-12 | Stop reason: HOSPADM

## 2018-07-11 RX ORDER — MIDAZOLAM HYDROCHLORIDE 1 MG/ML
.5-2 INJECTION, SOLUTION INTRAMUSCULAR; INTRAVENOUS
Status: DISCONTINUED | OUTPATIENT
Start: 2018-07-11 | End: 2018-07-11

## 2018-07-11 RX ORDER — HEPARIN SODIUM 200 [USP'U]/100ML
1000 INJECTION, SOLUTION INTRAVENOUS AS NEEDED
Status: DISCONTINUED | OUTPATIENT
Start: 2018-07-11 | End: 2018-07-12 | Stop reason: HOSPADM

## 2018-07-11 RX ORDER — FAMOTIDINE 20 MG/1
20 TABLET, FILM COATED ORAL ONCE
Status: COMPLETED | OUTPATIENT
Start: 2018-07-11 | End: 2018-07-11

## 2018-07-11 RX ORDER — SODIUM CHLORIDE, SODIUM LACTATE, POTASSIUM CHLORIDE, CALCIUM CHLORIDE 600; 310; 30; 20 MG/100ML; MG/100ML; MG/100ML; MG/100ML
100 INJECTION, SOLUTION INTRAVENOUS CONTINUOUS
Status: DISCONTINUED | OUTPATIENT
Start: 2018-07-11 | End: 2018-07-12 | Stop reason: HOSPADM

## 2018-07-11 RX ORDER — DEXTROSE 50 % IN WATER (D50W) INTRAVENOUS SYRINGE
25-50 AS NEEDED
Status: DISCONTINUED | OUTPATIENT
Start: 2018-07-11 | End: 2018-07-12 | Stop reason: HOSPADM

## 2018-07-11 RX ORDER — DEXTROSE 50 % IN WATER (D50W) INTRAVENOUS SYRINGE
Status: ACTIVE
Start: 2018-07-11 | End: 2018-07-11

## 2018-07-11 RX ORDER — LOPERAMIDE HYDROCHLORIDE 2 MG/1
4 CAPSULE ORAL 4 TIMES DAILY
Status: DISCONTINUED | OUTPATIENT
Start: 2018-07-11 | End: 2018-07-12 | Stop reason: HOSPADM

## 2018-07-11 RX ORDER — POLYETHYLENE GLYCOL 3350 17 G/17G
127 POWDER, FOR SOLUTION ORAL
Status: COMPLETED | OUTPATIENT
Start: 2018-07-11 | End: 2018-07-11

## 2018-07-11 RX ORDER — SODIUM CHLORIDE 9 MG/ML
25 INJECTION, SOLUTION INTRAVENOUS ONCE
Status: COMPLETED | OUTPATIENT
Start: 2018-07-11 | End: 2018-07-11

## 2018-07-11 RX ORDER — DIPHENHYDRAMINE HYDROCHLORIDE 50 MG/ML
25-50 INJECTION, SOLUTION INTRAMUSCULAR; INTRAVENOUS
Status: DISCONTINUED | OUTPATIENT
Start: 2018-07-11 | End: 2018-07-11

## 2018-07-11 RX ORDER — PROPOFOL 10 MG/ML
INJECTION, EMULSION INTRAVENOUS AS NEEDED
Status: DISCONTINUED | OUTPATIENT
Start: 2018-07-11 | End: 2018-07-11 | Stop reason: HOSPADM

## 2018-07-11 RX ORDER — LIDOCAINE HYDROCHLORIDE 20 MG/ML
INJECTION, SOLUTION EPIDURAL; INFILTRATION; INTRACAUDAL; PERINEURAL AS NEEDED
Status: DISCONTINUED | OUTPATIENT
Start: 2018-07-11 | End: 2018-07-11 | Stop reason: HOSPADM

## 2018-07-11 RX ORDER — FENTANYL CITRATE 50 UG/ML
25-100 INJECTION, SOLUTION INTRAMUSCULAR; INTRAVENOUS
Status: DISCONTINUED | OUTPATIENT
Start: 2018-07-11 | End: 2018-07-11

## 2018-07-11 RX ORDER — PROPOFOL 10 MG/ML
INJECTION, EMULSION INTRAVENOUS
Status: DISCONTINUED | OUTPATIENT
Start: 2018-07-11 | End: 2018-07-11 | Stop reason: HOSPADM

## 2018-07-11 RX ORDER — EPHEDRINE SULFATE 50 MG/ML
INJECTION, SOLUTION INTRAVENOUS AS NEEDED
Status: DISCONTINUED | OUTPATIENT
Start: 2018-07-11 | End: 2018-07-11 | Stop reason: HOSPADM

## 2018-07-11 RX ORDER — DIPHENHYDRAMINE HYDROCHLORIDE 50 MG/ML
50 INJECTION, SOLUTION INTRAMUSCULAR; INTRAVENOUS ONCE
Status: COMPLETED | OUTPATIENT
Start: 2018-07-11 | End: 2018-07-11

## 2018-07-11 RX ADMIN — LOPERAMIDE HYDROCHLORIDE 4 MG: 2 CAPSULE ORAL at 21:53

## 2018-07-11 RX ADMIN — FENTANYL CITRATE 50 MCG: 50 INJECTION, SOLUTION INTRAMUSCULAR; INTRAVENOUS at 16:41

## 2018-07-11 RX ADMIN — EPHEDRINE SULFATE 10 MG: 50 INJECTION, SOLUTION INTRAVENOUS at 14:22

## 2018-07-11 RX ADMIN — LIDOCAINE HYDROCHLORIDE 8 ML: 10 INJECTION, SOLUTION INFILTRATION; PERINEURAL at 16:40

## 2018-07-11 RX ADMIN — IPRATROPIUM BROMIDE AND ALBUTEROL SULFATE 3 ML: .5; 3 SOLUTION RESPIRATORY (INHALATION) at 21:07

## 2018-07-11 RX ADMIN — EPHEDRINE SULFATE 10 MG: 50 INJECTION, SOLUTION INTRAVENOUS at 14:33

## 2018-07-11 RX ADMIN — METHYLPREDNISOLONE SODIUM SUCCINATE 125 MG: 125 INJECTION, POWDER, FOR SOLUTION INTRAMUSCULAR; INTRAVENOUS at 16:07

## 2018-07-11 RX ADMIN — FOLIC ACID 1 MG: 1 TABLET ORAL at 09:15

## 2018-07-11 RX ADMIN — CIPROFLOXACIN 400 MG: 2 INJECTION, SOLUTION INTRAVENOUS at 10:33

## 2018-07-11 RX ADMIN — POTASSIUM CHLORIDE 40 MEQ: 20 TABLET, EXTENDED RELEASE ORAL at 18:35

## 2018-07-11 RX ADMIN — Medication 10 ML: at 05:56

## 2018-07-11 RX ADMIN — Medication 10 ML: at 21:55

## 2018-07-11 RX ADMIN — MIDAZOLAM HYDROCHLORIDE 1 MG: 1 INJECTION, SOLUTION INTRAMUSCULAR; INTRAVENOUS at 16:41

## 2018-07-11 RX ADMIN — FAMOTIDINE 20 MG: 10 INJECTION, SOLUTION INTRAVENOUS at 09:15

## 2018-07-11 RX ADMIN — FUROSEMIDE 40 MG: 40 TABLET ORAL at 09:15

## 2018-07-11 RX ADMIN — PROCHLORPERAZINE EDISYLATE 5 MG: 5 INJECTION INTRAMUSCULAR; INTRAVENOUS at 10:33

## 2018-07-11 RX ADMIN — SODIUM CHLORIDE 25 ML/HR: 900 INJECTION, SOLUTION INTRAVENOUS at 16:20

## 2018-07-11 RX ADMIN — FAMOTIDINE 20 MG: 20 TABLET ORAL at 16:07

## 2018-07-11 RX ADMIN — BUDESONIDE 500 MCG: 0.5 INHALANT RESPIRATORY (INHALATION) at 21:07

## 2018-07-11 RX ADMIN — METRONIDAZOLE 500 MG: 500 INJECTION, SOLUTION INTRAVENOUS at 05:56

## 2018-07-11 RX ADMIN — METOPROLOL TARTRATE 50 MG: 50 TABLET ORAL at 21:54

## 2018-07-11 RX ADMIN — IOPAMIDOL 42 ML: 612 INJECTION, SOLUTION INTRAVENOUS at 16:45

## 2018-07-11 RX ADMIN — FENTANYL CITRATE 50 MCG: 50 INJECTION, SOLUTION INTRAMUSCULAR; INTRAVENOUS at 16:28

## 2018-07-11 RX ADMIN — SODIUM BICARBONATE 2 ML: 0.2 INJECTION, SOLUTION INTRAVENOUS at 16:40

## 2018-07-11 RX ADMIN — LIDOCAINE HYDROCHLORIDE 50 MG: 20 INJECTION, SOLUTION EPIDURAL; INFILTRATION; INTRACAUDAL; PERINEURAL at 14:12

## 2018-07-11 RX ADMIN — SODIUM CHLORIDE, SODIUM LACTATE, POTASSIUM CHLORIDE, AND CALCIUM CHLORIDE 100 ML/HR: 600; 310; 30; 20 INJECTION, SOLUTION INTRAVENOUS at 13:46

## 2018-07-11 RX ADMIN — HEPARIN SODIUM 2000 UNITS: 200 INJECTION, SOLUTION INTRAVENOUS at 16:23

## 2018-07-11 RX ADMIN — METRONIDAZOLE 500 MG: 500 INJECTION, SOLUTION INTRAVENOUS at 21:53

## 2018-07-11 RX ADMIN — CIPROFLOXACIN 400 MG: 2 INJECTION, SOLUTION INTRAVENOUS at 23:31

## 2018-07-11 RX ADMIN — MIDAZOLAM HYDROCHLORIDE 1 MG: 1 INJECTION, SOLUTION INTRAMUSCULAR; INTRAVENOUS at 16:28

## 2018-07-11 RX ADMIN — PROPOFOL 140 MCG/KG/MIN: 10 INJECTION, EMULSION INTRAVENOUS at 14:15

## 2018-07-11 RX ADMIN — AMLODIPINE BESYLATE 5 MG: 5 TABLET ORAL at 09:15

## 2018-07-11 RX ADMIN — HYDROCODONE BITARTRATE AND ACETAMINOPHEN 1 TABLET: 10; 325 TABLET ORAL at 10:33

## 2018-07-11 RX ADMIN — DIPHENHYDRAMINE HYDROCHLORIDE 50 MG: 50 INJECTION, SOLUTION INTRAMUSCULAR; INTRAVENOUS at 16:07

## 2018-07-11 RX ADMIN — PROPOFOL 50 MG: 10 INJECTION, EMULSION INTRAVENOUS at 14:12

## 2018-07-11 RX ADMIN — DEXTROSE MONOHYDRATE 25 G: 25 INJECTION, SOLUTION INTRAVENOUS at 07:52

## 2018-07-11 RX ADMIN — FAMOTIDINE 20 MG: 10 INJECTION, SOLUTION INTRAVENOUS at 21:52

## 2018-07-11 RX ADMIN — METOPROLOL TARTRATE 50 MG: 50 TABLET ORAL at 09:15

## 2018-07-11 RX ADMIN — POLYETHYLENE GLYCOL 3350 127 G: 17 POWDER, FOR SOLUTION ORAL at 09:15

## 2018-07-11 NOTE — PROGRESS NOTES
Patient okay to go to room 30 minutes after last dose of sedation medication per Dr. Kezia Olivera verbal order.

## 2018-07-11 NOTE — PROGRESS NOTES
Additional prep for colonoscopy administered. Patient instructed to drink all by 1000. Will continue to monitor.

## 2018-07-11 NOTE — PROGRESS NOTES
Pt suspected to be uncompliant with CPAP. Tells clinicians that he puts it on himself, but is never seen with it on, and refuses to receive help with placing it on.
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
## 2018-07-11 NOTE — ROUTINE PROCESS
TRANSFER - OUT REPORT:    Verbal report given to Irineo Smith RN(name) on Neil Mukherjee  being transferred to radiology (unit) for ordered procedure       Report consisted of patients Situation, Background, Assessment and   Recommendations(SBAR). Information from the following report(s) SBAR, Kardex, Procedure Summary, Intake/Output, MAR, Accordion, Recent Results and Med Rec Status was reviewed with the receiving nurse. Lines:   Venous Access Device Upper chest (subclavicular area, right (Active)   Central Line Being Utilized Yes 7/11/2018 10:33 AM   Criteria for Appropriate Use Limited/no vessel suitable for conventional peripheral access 7/11/2018 10:33 AM   Site Assessment Clean, dry, & intact 7/11/2018 10:33 AM   Date of Last Dressing Change 07/10/18 7/11/2018 10:33 AM   Dressing Status Clean, dry, & intact 7/11/2018 10:33 AM   Dressing Type Disk with Chlorhexadine gluconate (CHG); Transparent 7/11/2018 10:33 AM   Action Taken Dressing changed 7/10/2018  1:10 PM   Date Accessed (Medial Site) 07/06/18 7/11/2018 10:33 AM   Access Needle Size (Site #1) 20 G 7/11/2018 10:33 AM   Access Needle Length (Medial Site) 0.75 inches 7/11/2018 10:33 AM   Positive Blood Return (Medial Site) Yes 7/11/2018 10:33 AM   Action Taken (Medial Site) Flushed 7/11/2018 10:33 AM   Date Needle Changed (Medial Site) 07/06/18 7/11/2018 10:33 AM        Opportunity for questions and clarification was provided.       Patient transported with:   [employer]

## 2018-07-11 NOTE — H&P (VIEW-ONLY)
[facility] Consult Note Primary GI Physician: Dr. Gloria Navarrete Referring Physician: Dr. Cornelio Best Consult Date:  7/9/2018 Admit Date:  7/6/2018 Chief Complaint:  Anemia with Heme positive stool Subjective:  
 
History of Present Illness:  Patient is a 62 y.o. male with PMH of (but not limited to) CAD on Plavix and Xarelto (last known dose 7/6), chronic pulmonary embolism, Gastric cancer with gastrectomy 2016, COPD oxygen dependent, HTN, Seizure disorder, Bipolar and Schizoaffective disorder, chronic anemia followed by hematology, and history of colon polyps, who is seen in consultation at the request of Dr. Cornelio Best for anemia and heme positive stool. Mr. Roderick Barreto is a known patient to Dr. Gloria Navarrete who was last evaluated in the office on 5/2018. At that time, he had abnormal LFTS which were concerning for possible metastasis and CT scan was ordered. This was done at [facility] 6/30/18 and revealed pancolitis. He was hospitalized there and treated with Ciprofloxacin and Flagyl. He continued to have intermittent diarrhea, abdominal pain and N&V. He was then admitted to [facility] on 7/6/18. He has continued on Ciprofloxacin and Flagyl with improvement of his symptoms. We have been asked to see him for his anemia and heme positive stool. He has had some intermittent BRRB over the last 2 days. He had a previous Colonoscopy by Dr. Gloria Navarrete 9/8/17 which revealed a suboptimal prep, diverticular disease, and 2 colon polyps. 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 Recommend repeat surveillance in 6 mos with 2 day prep. This was rescheduled at his last ov, but the patient called and cancelled this. Labs: 7/9/18 WBC 4.5, Hgb 7.4, Hct 21.2, MCV 94.1, RDW 19.6, plt 60, BUN 4, creat 1.13, T bili 0.4, ALT 35, AST 37, , albumin 1.9. 7/7/18 C. Difficile and culture negative EGD 9/7/17 by Dr. Gloria Navarrete revealed s/p gastrectomy.  He had a gastric mass on EGD 2016 that was positive for poorly differentiated ringlet cell cancer. He is s/p gastrectomy. PMH: 
Past Medical History:  
Diagnosis Date  B[facility] 1 disorder [facility]  CAD (coronary artery disease)  Cancer ([address] stomach  Chronic obstructive pulmonary disease ([address]  Gastrointestinal disorder   
 stomach cancer  Hypertension  Psychiatric disorder  Schizoaffective disorder ([address]  Seizures ([facility] PSH: 
Past Surgical History:  
Procedure Laterality Date  ABDOMEN SURGERY PROC UNLISTED    
 stomach removed per pt  CARDIAC SURG PROCEDURE UNLIST CABG  
 HX ORTHOPAEDIC    
 right knee replacement Allergies: Allergies Allergen Reactions  Abilify [Aripiprazole] Unknown (comments)  Contrast Dye [Iodine] Hives and Nausea and Vomiting Reports he has be premedicated  Penicillins Unknown (comments)  Wellbutrin [Bupropion] Other (comments) \"locking up\" Home Medications: 
Prior to Admission medications Medication Sig Start Date End Date Taking? Authorizing Provider  
oxyCODONE-acetaminophen (PERCOCET)  mg per tablet Take 1 Tab by mouth every six (6) hours as needed for Pain. Max Daily Amount: 4 Tabs. 4/8/17   [address] [place], [place] [address]Gurjit [address], MD  
 
 
[facility] Medications: 
Current Facility-Administered Medications Medication Dose Route Frequency  amLODIPine (NORVASC) tablet 5 mg  5 mg Oral DAILY  ciprofloxacin (CIPRO) 400 mg IVPB (premix)  400 mg IntraVENous Q12H  
 metroNIDAZOLE (FLAGYL) IVPB premix 500 mg  500 mg IntraVENous Q8H  
 nitroglycerin (NITROSTAT) tablet 0.4 mg  0.4 mg SubLINGual PRN  
 sodium chloride (NS) flush 5-10 mL  5-10 mL IntraVENous Q8H  
 sodium chloride (NS) flush 5-10 mL  5-10 mL IntraVENous PRN  
 acetaminophen (TYLENOL) tablet 650 mg  650 mg Oral Q4H PRN  
 naloxone (NARCAN) injection 0.4 mg  0.4 mg IntraVENous PRN  
 diphenhydrAMINE (BENADRYL) capsule 25 mg  25 mg Oral Q4H PRN  prochlorperazine (COMPAZINE) injection 5 mg  5 mg IntraVENous Q8H PRN  
 albuterol (PROVENTIL VENTOLIN) nebulizer solution 2.5 mg  2.5 mg Nebulization Q4H PRN  
 0.9% sodium chloride infusion 250 mL  250 mL IntraVENous PRN  
 dextrose 5% infusion  75 mL/hr IntraVENous CONTINUOUS  
 famotidine (PF) (PEPCID) 20 mg in sodium chloride 0.9% 10 mL injection  20 mg IntraVENous Q12H  
 metoprolol tartrate (LOPRESSOR) tablet 50 mg  50 mg Oral Q12H  furosemide (LASIX) tablet 40 mg  40 mg Oral DAILY  hydrALAZINE (APRESOLINE) 20 mg/mL injection 10 mg  10 mg IntraVENous Q6H PRN  
 HYDROcodone-acetaminophen (NORCO)  mg tablet 1 Tab  1 Tab Oral Q6H PRN  
 albuterol-ipratropium (DUO-NEB) 2.5 MG-0.5 MG/3 ML  3 mL Nebulization S5I RT  
 folic acid (FOLVITE) tablet 1 mg  1 mg Oral DAILY  clonazePAM (KlonoPIN) tablet 0.5 mg  0.5 mg Oral BID PRN  
 insulin lispro (HUMALOG) injection   SubCUTAneous AC&HS  budesonide (PULMICORT) 500 mcg/2 ml nebulizer suspension  500 mcg Nebulization BID RT  
 guaiFENesin ER (MUCINEX) tablet 600 mg  600 mg Oral Q12H Social History: 
Social History Substance Use Topics  Smoking status: Former Smoker  Smokeless tobacco: Not on file Comment: quit 30 years ago  Alcohol use No  
 
 
Family History: No family history on file. Review of Systems: A detailed 10 system ROS is obtained, with pertinent positives as listed above. All others are negative. Diet:  GI soft Objective:  
 
Physical Exam: 
Vitals: 
Visit Vitals  /74 (BP 1 Location: Right arm, BP Patient Position: At rest)  Pulse 61  Temp 98.5 °F (36.9 °C)  Resp 18  Ht 5' 8\" (1.727 m)  Wt 75.8 kg (167 lb)  SpO2 96%  BMI 25.39 kg/m2 Gen:  Pt is alert, cooperative, no acute distress FAMILY AT BEDSIDE Skin:  Extremities and face reveal no rashes. HEENT: Sclerae anicteric. Extra-occular muscles are intact. No oral ulcers. No abnormal pigmentation of the lips. The neck is supple.  
Cardiovascular: Regular rate and rhythm. No murmurs, gallops, or rubs. Respiratory:  Comfortable breathing with no accessory muscle use. Clear breath sounds anteriorly with no wheezes, rales, or rhonchi. GI:  Abdomen nondistended, soft, and nontender. Normal active bowel sounds. No enlargement of the liver or spleen. No masses palpable. Rectal:  Deferred Musculoskeletal:  No pitting edema of the lower legs. Neurological:  Gross memory appears intact. Patient is alert and oriented. Psychiatric:  Mood appears appropriate with judgement intact. Lymphatic:  No cervical or supraclavicular adenopathy. Laboratory:   
Recent Labs  
   07/09/18 
 2915  07/09/18 
 0543  07/09/18 
 3406  07/08/18 
 0457  07/07/18 
 0451  07/06/18 
 1946 WBC  6.6  4.5   --   6.6  5.1  5.1 HGB  11.2*  7.4*   --   11.1*  7.7*  7.1*  
HCT  31.6*  21.2*   --   32.2*  22.8*  20.7* PLT  97*  60*   --   100*  114*  101* MCV  94.0  94.1   --   94.4  104.6*  105.1* NA   --    --   145  147*  151*  151* K   --    --   3.5  3.8  4.3  3.9 CL   --    --   108*  111*  119*  121* CO2   --    --   30  27  23  24 BUN   --    --   4*  6  8  7 CREA   --    --   1.13  1.06  1.05  1.07  
CA   --    --   7.4*  7.3*  7.2*  7.1*  
MG   --    --    --    --   2.1   --   
GLU   --    --   82  69  85  72 AP   --    --    --    --    --   80 SGOT   --    --    --    --    --   28 ALT   --    --    --    --    --   37  
TBILI   --    --    --    --    --   0.2 ALB   --    --    --    --    --   1.9* TP   --    --    --    --    --   4.2*  
LPSE   --    --    --    --    --   29* Assessment:  
 
Principal Problem: 
  Pancolitis ([facility] (7/6/2018) Active Problems: 
  N&V (nausea and vomiting) (7/6/2018) Essential hypertension (7/6/2018) Panlobular emphysema ([facility] (7/6/2018) Anemia (7/6/2018) Chronic pulmonary embolism ([facility] (7/6/2018) Overview: Dx early June 2018; on Xarelto Hypoalbuminemia (7/7/2018)     Overview: Probably due to malnutrition Gastric cancer ([facility] (7/7/2018) COPD (chronic obstructive pulmonary disease) ([facility] (7/7/2018) SHANNON on CPAP (7/7/2018) Hypernatremia (7/7/2018) CAD (coronary artery disease) (7/7/2018) Hx of CABG (7/7/2018) 62 y.o. male with PMH of (but not limited to) CAD on Plavix and Xarelto (last known dose 7/6), chronic pulmonary embolism, Gastric cancer with gastrectomy 2016, COPD oxygen dependent, HTN, Seizure disorder, Bipolar and Schizoaffective disorder, chronic anemia followed by hematology, and history of colon polyps, who is seen in consultation at the request of Dr. Morena Majano for anemia and heme positive stool. Mr. Karl James is a known patient to Dr. Alley Albrecht with history of gastric cancer in 2016 s/p gastrectomy. He has known colon polyps and was scheduled for recent colonoscopy which the patient cancelled. His anemia could be secondary to friable polyp/mass/ possible IBD with recent pancolitis. Recurrent gastric cancer is a potential as well. Plan: 1.Schedule EGD and colonoscopy with a 2 day prep and once off Plavix/Xarelto for 5 days (last dose 7/6). We will plan on this for Wednesday 7/11. Risks and benefits were discussed with the patient to include risk of infection, bleeding, perforation, anesthesia, missed colon polyps and possible mortality. He verbalized understanding and wishes to proceed with procedures. 2.Clear liquid diet on 7/10 3. NPO after midnight 7/10 for procedure 7/11 4. Continue to hold Plavix/Xarelto 5. CEA 6. Agree with ciprofloxacin and Flagyl Sandy Blount. Juani Hdz, [address] [facility] Patient is seen and examined in collaboration with Dr. Regis Hashimoto. Assessment and plan as per Dr. Regis Hashimoto.

## 2018-07-11 NOTE — ANESTHESIA PREPROCEDURE EVALUATION
Anesthetic History     PONV          Review of Systems / Medical History  Patient summary reviewed and pertinent labs reviewed    Pulmonary    COPD (O2 dependent): severe    Sleep apnea: CPAP           Neuro/Psych     seizures: well controlled    Psychiatric history (Schizophrenia, Bipolar D/o and Schizoaffective D/o)     Cardiovascular    Hypertension          CAD, cardiac stents and CABG    Exercise tolerance: >4 METS  Comments: Last echo 7/2/18 at [facility] showing normal LVEF and no significant valvular disease.    GI/Hepatic/Renal  Within defined limits              Endo/Other        Cancer (Stomach Cancer and is s/p Gastrectomy)     Other Findings            Physical Exam    Airway  Mallampati: II  TM Distance: 4 - 6 cm  Neck ROM: normal range of motion   Mouth opening: Normal     Cardiovascular  Regular rate and rhythm,  S1 and S2 normal,  no murmur, click, rub, or gallop             Dental  No notable dental hx       Pulmonary  Breath sounds clear to auscultation               Abdominal  GI exam deferred       Other Findings            Anesthetic Plan    ASA: 3  Anesthesia type: total IV anesthesia          Induction: Intravenous  Anesthetic plan and risks discussed with: Patient and Spouse

## 2018-07-11 NOTE — PROGRESS NOTES
Hospitalist Progress Note Admit Date:  2018  6:46 PM  
Name:  Uyen Cabrales Age:  62 y.o. 
:  1960 MRN:  [id number] PCP:  Marylu Aburto MD 
Treatment Team: Attending Provider: Mikayla Urban MD; Consulting Provider: Paola Blair MD; Care Manager: Wes Chun [facility] [place] Subjective:  
 
 
 
 
Mr. Billy Carroll is a 61 yo male with PMH of gastric cancer s/p gastrectomy, chronic anemia followed by [facility] hematology, DVT/PE on xarelto since  [facility], CAD on plavix s/p CABG, COPD, SHANNON admitted with loose BM in the setting of recently diagnosed pancolitis on cipro/flagyl, acute blood loss anemia and JAYLIN. He has been managed with IVF, PRBC and IV cipro/flagyl since 18. BC and stool studies are negative. GI consulted and plans for colonoscopy/ EGD 18. IR consulted for IVC filter due to progressive anemia and recent DVT/PE. Plans are for home once improved. [facility] records reviewed and noted he had NM stress testing 18 low risk CT chest 7,3159 [facility] small PE 
LE duplex- bilateral DVT 
 
18 wife present, they are ok with plans for IVC filter, plans for scopes today, had some loose BM with bowel prep, no abd pain, ready to eat when its ok, denies further chest pain, has less edema, no dyspnea or cough Prefers to avoid xarelto in future as feels caused chest pain Objective:  
 
Patient Vitals for the past 24 hrs: 
 Temp Pulse Resp BP SpO2  
18 1408 - (!) 54 - 175/79 96 % 18 1332 - (!) 53 - 166/74 95 % 18 1038 98.1 °F (36.7 °C) 75 18 155/89 96 % 18 0744 98.1 °F (36.7 °C) 70 18 (!) 170/94 95 % 18 0522 98 °F (36.7 °C) 65 18 140/69 93 % 18 0240 - - - - 94 % 07/10/18 2314 98.2 °F (36.8 °C) (!) 58 18 119/66 96 % 07/10/18 2111 97.9 °F (36.6 °C) 74 18 149/80 93 % 07/10/18 2006 - - - - 94 % 07/10/18 1603 98.1 °F (36.7 °C) 68 18 131/78 93 % Oxygen Therapy O2 Sat (%): 96 % (18 1408) Pulse via Oximetry: 88 beats per minute (07/11/18 0240) O2 Device: CO2 nasal cannula (07/11/18 1408) Intake/Output Summary (Last 24 hours) at 07/11/18 1441 Last data filed at 07/11/18 1425 Gross per 24 hour Intake              250 ml Output                0 ml Net              250 ml General:    Well nourished. Alert. No distress CV:   RRR. No murmur, rub, or gallop. 1+ left > right edema Lungs:   CTAB. No wheezing, rhonchi, or rales. Abdomen:   Soft, nontender, nondistended. Decreased BS Extremities: Warm and dry. Skin:     No rashes or jaundice. Neuro:  No gross focal deficits Data Review: 
I have reviewed all labs, meds, telemetry events, and studies from the last 24 hours: 
 
Recent Results (from the past 24 hour(s)) GLUCOSE, POC Collection Time: 07/10/18  4:07 PM  
Result Value Ref Range Glucose (POC) 86 65 - 100 mg/dL GLUCOSE, POC Collection Time: 07/10/18  9:07 PM  
Result Value Ref Range Glucose (POC) 123 (H) 65 - 100 mg/dL GLUCOSE, POC Collection Time: 07/11/18  7:40 AM  
Result Value Ref Range Glucose (POC) 66 65 - 100 mg/dL METABOLIC PANEL, BASIC Collection Time: 07/11/18  8:03 AM  
Result Value Ref Range Sodium 143 136 - 145 mmol/L Potassium 3.3 (L) 3.5 - 5.1 mmol/L Chloride 109 (H) 98 - 107 mmol/L  
 CO2 27 21 - 32 mmol/L Anion gap 7 7 - 16 mmol/L Glucose 135 (H) 65 - 100 mg/dL BUN 3 (L) 6 - 23 MG/DL Creatinine 0.92 0.8 - 1.5 MG/DL  
 GFR est AA >60 >60 ml/min/1.73m2 GFR est non-AA >60 >60 ml/min/1.73m2 Calcium 7.6 (L) 8.3 - 10.4 MG/DL  
CBC WITH AUTOMATED DIFF Collection Time: 07/11/18  8:03 AM  
Result Value Ref Range WBC 6.0 4.3 - 11.1 K/uL  
 RBC 3.64 (L) 4.23 - 5.67 M/uL  
 HGB 12.2 (L) 13.6 - 17.2 g/dL HCT 34.8 (L) 41.1 - 50.3 % MCV 95.6 79.6 - 97.8 FL  
 MCH 33.5 (H) 26.1 - 32.9 PG  
 MCHC 35.1 (H) 31.4 - 35.0 g/dL  
 RDW 18.0 (H) 11.9 - 14.6 % PLATELET 97 (L) 617 - 450 K/uL  MPV 10.6 (L) 10.8 - 14.1 FL  
 DF AUTOMATED NEUTROPHILS 68 43 - 78 % LYMPHOCYTES 20 13 - 44 % MONOCYTES 10 4.0 - 12.0 % EOSINOPHILS 1 0.5 - 7.8 % BASOPHILS 1 0.0 - 2.0 % IMMATURE GRANULOCYTES 0 0.0 - 5.0 %  
 ABS. NEUTROPHILS 4.1 1.7 - 8.2 K/UL  
 ABS. LYMPHOCYTES 1.2 0.5 - 4.6 K/UL  
 ABS. MONOCYTES 0.6 0.1 - 1.3 K/UL  
 ABS. EOSINOPHILS 0.1 0.0 - 0.8 K/UL  
 ABS. BASOPHILS 0.0 0.0 - 0.2 K/UL  
 ABS. IMM. GRANS. 0.0 0.0 - 0.5 K/UL GLUCOSE, POC Collection Time: 07/11/18  8:22 AM  
Result Value Ref Range Glucose (POC) 107 (H) 65 - 100 mg/dL GLUCOSE, POC Collection Time: 07/11/18 10:43 AM  
Result Value Ref Range Glucose (POC) 116 (H) 65 - 100 mg/dL All Micro Results Procedure Component Value Units Date/Time CULTURE, BLOOD [[id number]] Collected:  07/08/18 0604 Order Status:  Completed Specimen:  Blood from Blood Updated:  07/11/18 3600 Special Requests: --     
  RIGHT 
PORT Culture result: NO GROWTH 3 DAYS     
 CULTURE, BLOOD [[id number]] Collected:  07/08/18 9045 Order Status:  Completed Specimen:  Blood from Blood Updated:  07/11/18 4655 Special Requests: --     
  RIGHT 
HAND Culture result: NO GROWTH 3 DAYS     
 CULTURE, STOOL [[id number]]  (Abnormal) Collected:  07/07/18 0845 Order Status:  Completed Specimen:  Stool Updated:  07/10/18 0720 Special Requests: NO SPECIAL REQUESTS Culture result:      
  NO GROWTH OF SALMONELLA OR SHIGELLA IS EVIDENT ON FIRST READING, FINAL REPORT TO FOLLOW AFTER FURTHER INCUBATION OF CULTURE (A) CULTURE IN PROGRESS,FURTHER UPDATES TO FOLLOW Herminia Schwarz [[id number]] Collected:  07/07/18 0845 Order Status:  Completed Specimen:  Stool Updated:  07/08/18 1433 Special Requests: NO SPECIAL REQUESTS Culture result: NEGATIVE Toxigenic C. diff NEGATIVE/ 027-NAP1-BI PRESUMPTIVE NEGATIVE  
 C. DIFFICILE/EPI PCR [[id number]] Order Status:  Canceled Specimen:  Stool Current Meds: 
Current Facility-Administered Medications Medication Dose Route Frequency  lactated Ringers infusion  100 mL/hr IntraVENous CONTINUOUS  
 sodium chloride (NS) flush 5-10 mL  5-10 mL IntraVENous PRN  
 dextrose (D50W) injection syrg 12.5-25 g  25-50 mL IntraVENous PRN  
 dextrose (D50W) 50% injection syrg  amLODIPine (NORVASC) tablet 5 mg  5 mg Oral DAILY  ciprofloxacin (CIPRO) 400 mg IVPB (premix)  400 mg IntraVENous Q12H  
 metroNIDAZOLE (FLAGYL) IVPB premix 500 mg  500 mg IntraVENous Q8H  
 nitroglycerin (NITROSTAT) tablet 0.4 mg  0.4 mg SubLINGual PRN  
 sodium chloride (NS) flush 5-10 mL  5-10 mL IntraVENous Q8H  
 sodium chloride (NS) flush 5-10 mL  5-10 mL IntraVENous PRN  
 acetaminophen (TYLENOL) tablet 650 mg  650 mg Oral Q4H PRN  
 naloxone (NARCAN) injection 0.4 mg  0.4 mg IntraVENous PRN  
 diphenhydrAMINE (BENADRYL) capsule 25 mg  25 mg Oral Q4H PRN  prochlorperazine (COMPAZINE) injection 5 mg  5 mg IntraVENous Q8H PRN  
 albuterol (PROVENTIL VENTOLIN) nebulizer solution 2.5 mg  2.5 mg Nebulization Q4H PRN  
 0.9% sodium chloride infusion 250 mL  250 mL IntraVENous PRN  
 famotidine (PF) (PEPCID) 20 mg in sodium chloride 0.9% 10 mL injection  20 mg IntraVENous Q12H  
 metoprolol tartrate (LOPRESSOR) tablet 50 mg  50 mg Oral Q12H  furosemide (LASIX) tablet 40 mg  40 mg Oral DAILY  hydrALAZINE (APRESOLINE) 20 mg/mL injection 10 mg  10 mg IntraVENous Q6H PRN  
 HYDROcodone-acetaminophen (NORCO)  mg tablet 1 Tab  1 Tab Oral Q6H PRN  
 albuterol-ipratropium (DUO-NEB) 2.5 MG-0.5 MG/3 ML  3 mL Nebulization L5S RT  
 folic acid (FOLVITE) tablet 1 mg  1 mg Oral DAILY  clonazePAM (KlonoPIN) tablet 0.5 mg  0.5 mg Oral BID PRN  
 insulin lispro (HUMALOG) injection   SubCUTAneous AC&HS  budesonide (PULMICORT) 500 mcg/2 ml nebulizer suspension  500 mcg Nebulization BID RT  
 guaiFENesin ER (MUCINEX) tablet 600 mg  600 mg Oral Q12H Facility-Administered Medications Ordered in Other Encounters Medication Dose Route Frequency  lidocaine (PF) (XYLOCAINE) 20 mg/mL (2 %) injection   IntraVENous PRN  propofol (DIPRIVAN) 10 mg/mL injection    PRN  
 ePHEDrine (MISTOLE) 50 mg/mL injection   IntraVENous PRN  propofol (DIPRIVAN) 10 mg/mL injection    CONTINUOUS  
 PHENYLephrine 100 mcg/mL 10 mL syringe (ONE-STEP)  1,000 mcg IntraVENous CONTINUOUS Other Studies (last 24 hours): No results found. Assessment and Plan:  
 
Hospital Problems as of 7/11/2018  Date Reviewed: 7/7/2018 Codes Class Noted - Resolved POA Hypoalbuminemia (Chronic) ICD-10-CM: E88.09 
ICD-9-CM: 273.8  7/7/2018 - Present Yes Overview Signed 7/7/2018  6:22 AM by Daniel Carson MD  
  Probably due to malnutrition Gastric cancer ([facility] ICD-10-CM: C16.9 ICD-9-CM: 151.9  7/7/2018 - Present Unknown COPD (chronic obstructive pulmonary disease) (HCC) ICD-10-CM: J44.9 ICD-9-CM: 506  7/7/2018 - Present Unknown SHANNON on CPAP ICD-10-CM: G47.33, Z99.89 ICD-9-CM: 327.23, V46.8  7/7/2018 - Present Unknown Hypernatremia ICD-10-CM: E87.0 ICD-9-CM: 276.0  7/7/2018 - Present Yes CAD (coronary artery disease) ICD-10-CM: I25.10 ICD-9-CM: 414.00  7/7/2018 - Present Unknown Hx of CABG ICD-10-CM: Z95.1 ICD-9-CM: V45.81  7/7/2018 - Present Unknown * (Principal)Pancolitis ([facility] ICD-10-CM: K51.00 ICD-9-CM: 556.6  7/6/2018 - Present Yes  
   
 N&V (nausea and vomiting) ICD-10-CM: R11.2 ICD-9-CM: 787.01  7/6/2018 - Present Yes Essential hypertension (Chronic) ICD-10-CM: I10 
ICD-9-CM: 401.9  7/6/2018 - Present Yes Panlobular emphysema (HCC) (Chronic) ICD-10-CM: J43.1 ICD-9-CM: 492.8  7/6/2018 - Present Yes Anemia (Chronic) ICD-10-CM: D64.9 ICD-9-CM: 285.9  7/6/2018 - Present Yes Chronic pulmonary embolism (HCC) (Chronic) ICD-10-CM: C45.99 
ICD-9-CM: 416.2  7/6/2018 - Present Yes  Overview Signed 7/6/2018 11:17 PM by Isidro Robbins MD  
  Dx early June 2018; on Xarelto PLAN:   
· Pancolitis: continue cipro/flagyl day 4, appreciate GI for pending scopes today · CAD: held plavix due to planned endoscopy, troponin negative x 3 and complaints resolved, low risk stress testing at [address] · Bilateral DVT/PE: held xarelto for scopes and due to Acute anemia, consulted IR for IVC filter and patient/wife agree and understand indications,  
· Anemia: as above, trend HGB, stable · HTN: continued home meds · Hypokalemia: replace and followup BMP · DM2: sliding scale insulin, some hypoglycemia as NPO managed with D50 
· Hypernatremia: resolved · COPD:stable DC planning/Dispo:  Home at discharge likely 7-12 Diet:  DIET NUTRITIONAL SUPPLEMENTS 
DIET NUTRITIONAL SUPPLEMENTS 
DIET NPO EXCEPT MEDS 
DVT ppx:  SCD contraindicated with subacute DVT/ no anticoagulation with anemia Signed: Cheryl Cutler MD

## 2018-07-11 NOTE — ANESTHESIA POSTPROCEDURE EVALUATION
Post-Anesthesia Evaluation and Assessment    Patient: Tahir Peace MRN: [id number]  SSN: [id number]    YOB: 1960  Age: 62 y.o. Sex: male       Cardiovascular Function/Vital Signs  Visit Vitals    /83    Pulse 62    Temp 36.6 °C (97.8 °F)    Resp 16    Ht 5' 8\" (1.727 m)    Wt 75.8 kg (167 lb)    SpO2 99%    BMI 25.39 kg/m2       Patient is status post total IV anesthesia anesthesia for Procedure(s):  ESOPHAGOGASTRODUODENOSCOPY (EGD)  COLONOSCOPY/32/ 612  ENDOSCOPIC POLYPECTOMY. Nausea/Vomiting: None    Postoperative hydration reviewed and adequate. Pain:  Pain Scale 1: Numeric (0 - 10) (07/11/18 1541)  Pain Intensity 1: 0 (07/11/18 1332)   Managed    Neurological Status:   Neuro  Neurologic State: Alert (07/11/18 0700)  Orientation Level: Oriented X4 (07/11/18 0700)  Cognition: Follows commands (07/10/18 2045)   At baseline    Mental Status and Level of Consciousness: Arousable    Pulmonary Status:   O2 Device: Nasal cannula (07/11/18 1541)   Adequate oxygenation and airway patent    Complications related to anesthesia: None    Post-anesthesia assessment completed.  No concerns    Signed By: Nicko Rosa MD     July 11, 2018

## 2018-07-11 NOTE — PROGRESS NOTES
CNA was in pt's room again to offer pt a bath but pt's daughter said to CNA that pt already went to the bathroom and washed himself up. Nurse encouraged pt to always call for help but pt responded \" I'm not paralyzed, I can help myself with whatever I want. \"

## 2018-07-11 NOTE — PROGRESS NOTES
TRANSFER - OUT REPORT:    Verbal report given to Yasmine Rankin RN(name) on Fabi Landaverde  being transferred to 6th floor(unit) for routine progression of care       Report consisted of patients Situation, Background, Assessment and   Recommendations(SBAR). Information from the following report(s) SBAR, Procedure Summary and MAR was reviewed with the receiving nurse. Lines:   Venous Access Device Upper chest (subclavicular area, right (Active)   Central Line Being Utilized Yes 7/11/2018 10:33 AM   Criteria for Appropriate Use Limited/no vessel suitable for conventional peripheral access 7/11/2018 10:33 AM   Site Assessment Clean, dry, & intact 7/11/2018 10:33 AM   Date of Last Dressing Change 07/10/18 7/11/2018 10:33 AM   Dressing Status Clean, dry, & intact 7/11/2018 10:33 AM   Dressing Type Disk with Chlorhexadine gluconate (CHG); Transparent 7/11/2018 10:33 AM   Action Taken Dressing changed 7/10/2018  1:10 PM   Date Accessed (Medial Site) 07/06/18 7/11/2018 10:33 AM   Access Needle Size (Site #1) 20 G 7/11/2018 10:33 AM   Access Needle Length (Medial Site) 0.75 inches 7/11/2018 10:33 AM   Positive Blood Return (Medial Site) Yes 7/11/2018 10:33 AM   Action Taken (Medial Site) Flushed 7/11/2018 10:33 AM   Date Needle Changed (Medial Site) 07/06/18 7/11/2018 10:33 AM        Opportunity for questions and clarification was provided.       Patient transported with:   [employer]

## 2018-07-11 NOTE — INTERVAL H&P NOTE
H&P Update:  Fantasma Ryan was seen and examined. History and physical has been reviewed. The patient has been examined.  There have been no significant clinical changes since the completion of the originally dated History and Physical.    Signed By: Tanesha Khan MD     July 11, 2018 1:48 PM

## 2018-07-11 NOTE — PROGRESS NOTES
GI DAILY PROGRESS NOTE    Admit Date:  7/6/2018    Today's Date:  7/11/2018    CC:  Anemia with heme positive stool    Subjective:     Patient: Patient only had 3-4 BM since beginning bowel prep yesterday. He states that his last stool was \"yellow like gatorade. \" He denies any abdominal pain, N&V.      Medications:   Current Facility-Administered Medications   Medication Dose Route Frequency    dextrose (D50W) injection syrg 12.5-25 g  25-50 mL IntraVENous PRN    dextrose (D50W) 50% injection syrg        polyethylene glycol (MIRALAX) powder 127 g  127 g Oral NOW    amLODIPine (NORVASC) tablet 5 mg  5 mg Oral DAILY    ciprofloxacin (CIPRO) 400 mg IVPB (premix)  400 mg IntraVENous Q12H    metroNIDAZOLE (FLAGYL) IVPB premix 500 mg  500 mg IntraVENous Q8H    nitroglycerin (NITROSTAT) tablet 0.4 mg  0.4 mg SubLINGual PRN    sodium chloride (NS) flush 5-10 mL  5-10 mL IntraVENous Q8H    sodium chloride (NS) flush 5-10 mL  5-10 mL IntraVENous PRN    acetaminophen (TYLENOL) tablet 650 mg  650 mg Oral Q4H PRN    naloxone (NARCAN) injection 0.4 mg  0.4 mg IntraVENous PRN    diphenhydrAMINE (BENADRYL) capsule 25 mg  25 mg Oral Q4H PRN    prochlorperazine (COMPAZINE) injection 5 mg  5 mg IntraVENous Q8H PRN    albuterol (PROVENTIL VENTOLIN) nebulizer solution 2.5 mg  2.5 mg Nebulization Q4H PRN    0.9% sodium chloride infusion 250 mL  250 mL IntraVENous PRN    famotidine (PF) (PEPCID) 20 mg in sodium chloride 0.9% 10 mL injection  20 mg IntraVENous Q12H    metoprolol tartrate (LOPRESSOR) tablet 50 mg  50 mg Oral Q12H    furosemide (LASIX) tablet 40 mg  40 mg Oral DAILY    hydrALAZINE (APRESOLINE) 20 mg/mL injection 10 mg  10 mg IntraVENous Q6H PRN    HYDROcodone-acetaminophen (NORCO)  mg tablet 1 Tab  1 Tab Oral Q6H PRN    albuterol-ipratropium (DUO-NEB) 2.5 MG-0.5 MG/3 ML  3 mL Nebulization C7Z RT    folic acid (FOLVITE) tablet 1 mg  1 mg Oral DAILY    clonazePAM (KlonoPIN) tablet 0.5 mg  0.5 mg Oral BID PRN    insulin lispro (HUMALOG) injection   SubCUTAneous AC&HS    budesonide (PULMICORT) 500 mcg/2 ml nebulizer suspension  500 mcg Nebulization BID RT    guaiFENesin ER (MUCINEX) tablet 600 mg  600 mg Oral Q12H       Review of Systems:  ROS was obtained, with pertinent positives as listed above. No chest pain or SOB. Diet:  NPO    Objective:   Vitals:  Visit Vitals    BP (!) 170/94 (BP 1 Location: Right arm, BP Patient Position: At rest)    Pulse 70    Temp 98.1 °F (36.7 °C)    Resp 18    Ht 5' 8\" (1.727 m)    Wt 75.8 kg (167 lb)    SpO2 95%    BMI 25.39 kg/m2     Intake/Output:     07/09 1901 - 07/11 0700  In: 240 [P.O.:240]  Out: 150 [Urine:150]  Exam:  General appearance: alert, cooperative, no distress FAMILY AT BEDSIDE  Lungs: clear to auscultation bilaterally anteriorly  Heart: regular rate and rhythm  Abdomen: soft, non-tender.  Bowel sounds normal. No masses, no organomegaly  Extremities: extremities normal, atraumatic, no cyanosis or edema  Neuro:  alert and oriented X4    Data Review (Labs):    Recent Labs      07/10/18   0555  07/09/18   0635  07/09/18   0543  07/09/18   0519   WBC  6.4  6.6  4.5   --    HGB  12.1*  11.2*  7.4*   --    HCT  34.6*  31.6*  21.2*   --    PLT  92*  97*  60*   --    MCV  94.8  94.0  94.1   --    NA  144   --    --   145   K  3.3*   --    --   3.5   CL  108*   --    --   108*   CO2  27   --    --   30   BUN  4*   --    --   4*   CREA  0.93   --    --   1.13   CA  7.5*   --    --   7.4*   GLU  82   --    --   82   AP   --    --    --   107   SGOT   --    --    --   37   ALT   --    --    --   35   TBILI   --    --    --   0.4   CBIL   --    --    --   0.1   ALB   --    --    --   1.9*   TP   --    --    --   4.4*   PTP  13.5   --    --    --    INR  1.1   --    --    --        Assessment:     Principal Problem:    Pancolitis (HCC) (7/6/2018)    Active Problems:    N&V (nausea and vomiting) (7/6/2018)      Essential hypertension (7/6/2018)      Panlobular emphysema ([facility] (7/6/2018)      Anemia (7/6/2018)      Chronic pulmonary embolism ([address] (7/6/2018)      Overview: Dx early June 2018; on Xarelto      Hypoalbuminemia (7/7/2018)      Overview: Probably due to malnutrition      Gastric cancer ([facility] (7/7/2018)      COPD (chronic obstructive pulmonary disease) ([facility] (7/7/2018)      SHANNON on CPAP (7/7/2018)      Hypernatremia (7/7/2018)      CAD (coronary artery disease) (7/7/2018)      Hx of CABG (7/7/2018)    62 y.o. Colan Or PMH of (but not limited to) CAD on Plavix and Xarelto (last known dose 7/6), chronic pulmonary embolism, Gastric cancer with gastrectomy 2016, COPD oxygen dependent, HTN, Seizure disorder, Bipolar and Schizoaffective disorder, chronic anemia followed by hematology, and history of colon polyps, who we are following for anemia and heme positive stool. Mr. Joni Barrett is a known patient to Dr. Gail Gardner with history of gastric cancer in 2016 s/p gastrectomy. His current CEA is elevated at 15.3 on 7/9. He has known colon polyps and was scheduled for recent colonoscopy which the patient cancelled. His anemia could be secondary to friable polyp/mass/ possible IBD with recent pancolitis and hypoalbuminemia. Recurrent gastric cancer is a potential as well. He is scheduled for colonoscopy/EGD today 7/11, but needs more bowel prep prior to procedure. Plan: 1. Miralax 127 gm NOW and must complete by 1000 for Colonoscopy today. 2.NPO except his Miralax currently  3. Plan for EGD and Colonoscopy by Dr. Laura Stapleton today 7/11. Willian Rice.  Manda Dinh in collaboration with Dr. Laura Stapleton  [facility]

## 2018-07-11 NOTE — PROGRESS NOTES
Pt and family at bedside upset with day shift staff and request to speak with supervisor Gail Bautista. Gail Bautista updated to situation and will handle pt/ family concerns. Gail Bautista at pt bedside 1936.

## 2018-07-11 NOTE — PROGRESS NOTES
TRANSFER - IN REPORT:    Verbal report received from 80, RN on Sundra Solan being received from [place] for ordered procedure      Report consisted of patients Situation, Background, Assessment and Recommendations(SBAR). Information from the following report(s) Recent Results was reviewed with the receiving nurse. Opportunity for questions and clarification was provided.

## 2018-07-11 NOTE — PROCEDURES
Esophagogastroduodenoscopy    DATE of PROCEDURE: 7/11/2018    MEDICATIONS ADMINISTERED: MAC    INSTRUMENT: GIF    PROCEDURE:  After obtaining informed consent, the patient was placed in the left lateral position and sedated. The endoscope was advanced under direct vision without difficulty. The esophagus, stomach (including retroflexed views) and duodenum were evaluated. The patient was taken to the recovery area in stable condition. FINDINGS:  ESOPHAGUS:  Normal without ulcers or hiatal hernia or esophagitis  STOMACH: The gastric pouch measures about 5cm. There is a blind end as well as an efferent limb - both of which are healthy without ulcerations  DUODENUM: Normal    Estimated blood loss: 0-minimal     PLAN:  1. Normal egd except prior surgery  2.  Proceed with colonoscopy    Olga Leon MD  [facility] [place]

## 2018-07-11 NOTE — PROGRESS NOTES
Hourly rounds performed; all pt needs met. No acute clinical changes/ complaints during night. PRN pain medication administered x1. Pt completed colon prep per orders and remained NPO since midnight for scheduled procedure. Bedside shift report to Alivia Rodriguez RN.

## 2018-07-11 NOTE — PROCEDURES
COLONOSCOPY    DATE of PROCEDURE: 7/11/2018    MEDICATION:  MAC      INSTRUMENT: PCF    PROCEDURE: After obtaining informed consent, the patient was placed in the left lateral position and sedated. The endoscope was advanced to the cecum where the appendiceal orifice and ileocecal valve were identified. On withdrawal, the colon was carefully visualized in a 360 degree fashion. Retroflexion was performed in the rectum. The patient was taken to the recovery area in stable condition. FINDINGS:  Rectum: normal  Sigmoid: normal  Descending Colon: 2 dimunitive polyps removed with cold forceps and sent for pathology  Transverse Colon: normal - at the hepatic flexure a 5mm biloped polyp noted and removed with cold forceps and sent for pathology. Ascending Colon: normal  Cecum: normal, tortuous colon. Terminal ileum: not entered    Estimated blood loss: 0-minimal         ASSESSMENT/PLAN:  1. F/up pathology - next colo likely 2 yrs given hx of HGD in polyp in past  2. Check folate and B12 levels  3. Monitor CEA - repeat in 2 months  4.  Agree with d/c of Xarelto in favor of IVC filter    Reynaldo Richardson MD  [facility] [place]

## 2018-07-11 NOTE — PROGRESS NOTES
Hourly rounds completed this shift. Patient resting in bed with wife at bedside. Report given to oncoming RN.

## 2018-07-11 NOTE — PROCEDURES
Department of Interventional Radiology  [phone]        Interventional Radiology Brief Procedure Note    Patient: Tahir Peace MRN: [id number]  SSN: [id number]    YOB: 1960  Age: 62 y.o. Sex: male      Date of Procedure: 7/11/2018    Pre-Procedure Diagnosis: Bi LE DVT, acute blood loss anemia, history of gastrectomy for gastric cancer in 2016. Post-Procedure Diagnosis: SAME    Procedure(s): IVC Venogram with Retrievable IVCFilter placement. Brief Description of Procedure: as above    Performed By: Leonarda Mays MD     Assistants: None    Anesthesia:Moderate Sedation    Estimated Blood Loss: Less than 10ml    Specimens: None    Implants: See Above    Findings: Normal diameter IVC. Complications: None    Recommendations: 1 hour bedrest w HOB elevated. Follow Up: Office visit in 3 months--scheduled.       Signed By: Leonarda Mays MD     July 11, 2018

## 2018-07-12 VITALS
HEIGHT: 68 IN | TEMPERATURE: 98.1 F | RESPIRATION RATE: 15 BRPM | OXYGEN SATURATION: 92 % | BODY MASS INDEX: 25.31 KG/M2 | WEIGHT: 167 LBS | SYSTOLIC BLOOD PRESSURE: 142 MMHG | DIASTOLIC BLOOD PRESSURE: 81 MMHG | HEART RATE: 68 BPM

## 2018-07-12 LAB
ANION GAP SERPL CALC-SCNC: 10 MMOL/L (ref 7–16)
BACTERIA SPEC CULT: NORMAL
BASOPHILS # BLD: 0 K/UL (ref 0–0.2)
BASOPHILS NFR BLD: 0 % (ref 0–2)
BUN SERPL-MCNC: 3 MG/DL (ref 6–23)
CALCIUM SERPL-MCNC: 7.8 MG/DL (ref 8.3–10.4)
CHLORIDE SERPL-SCNC: 108 MMOL/L (ref 98–107)
CO2 SERPL-SCNC: 26 MMOL/L (ref 21–32)
CREAT SERPL-MCNC: 0.94 MG/DL (ref 0.8–1.5)
DIFFERENTIAL METHOD BLD: ABNORMAL
EOSINOPHIL # BLD: 0 K/UL (ref 0–0.8)
EOSINOPHIL NFR BLD: 0 % (ref 0.5–7.8)
ERYTHROCYTE [DISTWIDTH] IN BLOOD BY AUTOMATED COUNT: 17.9 % (ref 11.9–14.6)
FOLATE SERPL-MCNC: 27 NG/ML (ref 3.1–17.5)
GLUCOSE BLD STRIP.AUTO-MCNC: 129 MG/DL (ref 65–100)
GLUCOSE SERPL-MCNC: 117 MG/DL (ref 65–100)
HCT VFR BLD AUTO: 34.9 % (ref 41.1–50.3)
HGB BLD-MCNC: 11.9 G/DL (ref 13.6–17.2)
IMM GRANULOCYTES # BLD: 0 K/UL (ref 0–0.5)
IMM GRANULOCYTES NFR BLD AUTO: 0 % (ref 0–5)
LYMPHOCYTES # BLD: 0.4 K/UL (ref 0.5–4.6)
LYMPHOCYTES NFR BLD: 8 % (ref 13–44)
MCH RBC QN AUTO: 33.1 PG (ref 26.1–32.9)
MCHC RBC AUTO-ENTMCNC: 34.1 G/DL (ref 31.4–35)
MCV RBC AUTO: 96.9 FL (ref 79.6–97.8)
MONOCYTES # BLD: 0.2 K/UL (ref 0.1–1.3)
MONOCYTES NFR BLD: 5 % (ref 4–12)
NEUTS SEG # BLD: 4.1 K/UL (ref 1.7–8.2)
NEUTS SEG NFR BLD: 87 % (ref 43–78)
PLATELET # BLD AUTO: 109 K/UL (ref 150–450)
PMV BLD AUTO: 11.4 FL (ref 10.8–14.1)
POTASSIUM SERPL-SCNC: 3.5 MMOL/L (ref 3.5–5.1)
RBC # BLD AUTO: 3.6 M/UL (ref 4.23–5.67)
SERVICE CMNT-IMP: NORMAL
SODIUM SERPL-SCNC: 144 MMOL/L (ref 136–145)
VIT B12 SERPL-MCNC: 1182 PG/ML (ref 193–986)
WBC # BLD AUTO: 4.7 K/UL (ref 4.3–11.1)

## 2018-07-12 PROCEDURE — 82607 VITAMIN B-12: CPT | Performed by: INTERNAL MEDICINE

## 2018-07-12 PROCEDURE — 82746 ASSAY OF FOLIC ACID SERUM: CPT | Performed by: INTERNAL MEDICINE

## 2018-07-12 PROCEDURE — 80048 BASIC METABOLIC PNL TOTAL CA: CPT | Performed by: INTERNAL MEDICINE

## 2018-07-12 PROCEDURE — 74011250637 HC RX REV CODE- 250/637: Performed by: INTERNAL MEDICINE

## 2018-07-12 PROCEDURE — 36415 COLL VENOUS BLD VENIPUNCTURE: CPT | Performed by: INTERNAL MEDICINE

## 2018-07-12 PROCEDURE — 85025 COMPLETE CBC W/AUTO DIFF WBC: CPT | Performed by: INTERNAL MEDICINE

## 2018-07-12 PROCEDURE — 74011000250 HC RX REV CODE- 250: Performed by: INTERNAL MEDICINE

## 2018-07-12 PROCEDURE — 82962 GLUCOSE BLOOD TEST: CPT

## 2018-07-12 RX ORDER — AMLODIPINE BESYLATE 10 MG/1
10 TABLET ORAL DAILY
Qty: 90 TAB | Refills: 0 | Status: SHIPPED | OUTPATIENT
Start: 2018-07-12

## 2018-07-12 RX ORDER — AMLODIPINE BESYLATE 10 MG/1
10 TABLET ORAL DAILY
Status: DISCONTINUED | OUTPATIENT
Start: 2018-07-12 | End: 2018-07-12 | Stop reason: HOSPADM

## 2018-07-12 RX ORDER — FOLIC ACID 1 MG/1
1 TABLET ORAL DAILY
Qty: 90 TAB | Refills: 0 | Status: SHIPPED | OUTPATIENT
Start: 2018-07-12

## 2018-07-12 RX ORDER — HEPARIN 100 UNIT/ML
300 SYRINGE INTRAVENOUS AS NEEDED
Status: DISCONTINUED | OUTPATIENT
Start: 2018-07-12 | End: 2018-07-12 | Stop reason: HOSPADM

## 2018-07-12 RX ORDER — GUAIFENESIN 600 MG/1
600 TABLET, EXTENDED RELEASE ORAL EVERY 12 HOURS
Qty: 60 TAB | Refills: 0 | Status: SHIPPED | OUTPATIENT
Start: 2018-07-12

## 2018-07-12 RX ORDER — METOPROLOL TARTRATE 50 MG/1
50 TABLET ORAL EVERY 12 HOURS
Qty: 120 TAB | Refills: 0 | Status: SHIPPED | OUTPATIENT
Start: 2018-07-12

## 2018-07-12 RX ORDER — FUROSEMIDE 40 MG/1
40 TABLET ORAL DAILY
Qty: 90 TAB | Refills: 0 | Status: SHIPPED | OUTPATIENT
Start: 2018-07-12

## 2018-07-12 RX ADMIN — AMLODIPINE BESYLATE 10 MG: 10 TABLET ORAL at 09:03

## 2018-07-12 RX ADMIN — FUROSEMIDE 40 MG: 40 TABLET ORAL at 09:03

## 2018-07-12 RX ADMIN — HYDROCODONE BITARTRATE AND ACETAMINOPHEN 1 TABLET: 10; 325 TABLET ORAL at 09:03

## 2018-07-12 RX ADMIN — METOPROLOL TARTRATE 50 MG: 50 TABLET ORAL at 09:03

## 2018-07-12 RX ADMIN — LOPERAMIDE HYDROCHLORIDE 4 MG: 2 CAPSULE ORAL at 09:03

## 2018-07-12 RX ADMIN — FOLIC ACID 1 MG: 1 TABLET ORAL at 09:03

## 2018-07-12 RX ADMIN — METRONIDAZOLE 500 MG: 500 INJECTION, SOLUTION INTRAVENOUS at 06:13

## 2018-07-12 RX ADMIN — Medication 10 ML: at 06:13

## 2018-07-12 RX ADMIN — GUAIFENESIN 600 MG: 600 TABLET, EXTENDED RELEASE ORAL at 09:03

## 2018-07-12 NOTE — PROGRESS NOTES
7/12/2018 RE: Eduskyler Garduno To Whom it May Concern: This is to certify that Diane Rayo was at the hospital on July 12, 2018 to assist in the care of her . Please excuse from work. Please feel free to contact my office if you have any questions or concerns. Thank you for your assistance in this matter. Sincerely, 
 
 
Dr. Tyshawn Ontiveros [phone]

## 2018-07-12 NOTE — DISCHARGE INSTRUCTIONS
DISCHARGE SUMMARY from Nurse    PATIENT INSTRUCTIONS:    After general anesthesia or intravenous sedation, for 24 hours or while taking prescription Narcotics:  · Limit your activities  · Do not drive and operate hazardous machinery  · Do not make important personal or business decisions  · Do  not drink alcoholic beverages  · If you have not urinated within 8 hours after discharge, please contact your surgeon on call. Report the following to your surgeon:  · Excessive pain, swelling, redness or odor of or around the surgical area  · Temperature over 100.5  · Nausea and vomiting lasting longer than 4 hours or if unable to take medications  · Any signs of decreased circulation or nerve impairment to extremity: change in color, persistent  numbness, tingling, coldness or increase pain  · Any questions    What to do at Home:  Recommended activity: Activity as tolerated    If you experience any of the following symptoms increased pain, fever greater than 101, nausea/vomiting, or bloody diarrhea, please follow up with your primary care physician. *  Please give a list of your current medications to your Primary Care Provider. *  Please update this list whenever your medications are discontinued, doses are      changed, or new medications (including over-the-counter products) are added. *  Please carry medication information at all times in case of emergency situations. These are general instructions for a healthy lifestyle:    No smoking/ No tobacco products/ Avoid exposure to second hand smoke  Surgeon General's Warning:  Quitting smoking now greatly reduces serious risk to your health.     Obesity, smoking, and sedentary lifestyle greatly increases your risk for illness    A healthy diet, regular physical exercise & weight monitoring are important for maintaining a healthy lifestyle    You may be retaining fluid if you have a history of heart failure or if you experience any of the following symptoms: Weight gain of 3 pounds or more overnight or 5 pounds in a week, increased swelling in our hands or feet or shortness of breath while lying flat in bed. Please call your doctor as soon as you notice any of these symptoms; do not wait until your next office visit. Recognize signs and symptoms of STROKE:    F-face looks uneven    A-arms unable to move or move unevenly    S-speech slurred or non-existent    T-time-call 911 as soon as signs and symptoms begin-DO NOT go       Back to bed or wait to see if you get better-TIME IS BRAIN. Warning Signs of HEART ATTACK     Call 911 if you have these symptoms:   Chest discomfort. Most heart attacks involve discomfort in the center of the chest that lasts more than a few minutes, or that goes away and comes back. It can feel like uncomfortable pressure, squeezing, fullness, or pain.  Discomfort in other areas of the upper body. Symptoms can include pain or discomfort in one or both arms, the back, neck, jaw, or stomach.  Shortness of breath with or without chest discomfort.  Other signs may include breaking out in a cold sweat, nausea, or lightheadedness. Don't wait more than five minutes to call 911 - MINUTES MATTER! Fast action can save your life. Calling 911 is almost always the fastest way to get lifesaving treatment. Emergency Medical Services staff can begin treatment when they arrive -- up to an hour sooner than if someone gets to the hospital by car. The discharge information has been reviewed with the patient. The patient verbalized understanding. Discharge medications reviewed with the patient and appropriate educational materials and side effects teaching were provided.   ___________________________________________________________________________________________________________________________________

## 2018-07-12 NOTE — PROGRESS NOTES
LATE NOTE:  In accordance with Medicare guidelines, care manager attempted to deliver a copy of the Important Letter from Andre Madison, pt had been d/c'd a few minutes prior.

## 2018-07-12 NOTE — PROGRESS NOTES
CPAP set up with 2L bled in, patient stated he can put mask on when he is ready to go to sleep. Stated he did not need any help, patients wife at bedside.

## 2018-07-12 NOTE — DISCHARGE SUMMARY
Hospitalist Discharge Summary     Admit Date:  2018  6:46 PM   Name:  Shelbi Gibson   Age:  62 y.o.  :  1960   MRN:  [id number]   PCP:  Maddi Blackmon MD  Treatment Team: Attending Provider: Genesis Platt MD; Consulting Provider: Freda Chase MD; Care Manager: Yoon Pardo LMSW    Problem List for this Hospitalization:  Hospital Problems as of 2018  Date Reviewed: 2018          Codes Class Noted - Resolved POA    Hypoalbuminemia (Chronic) ICD-10-CM: E88.09  ICD-9-CM: 273.8  2018 - Present Yes    Overview Signed 2018  6:22 AM by Genesis Platt MD     Probably due to malnutrition             Gastric cancer ([facility] ICD-10-CM: C16.9  ICD-9-CM: 151.9  2018 - Present Unknown        COPD (chronic obstructive pulmonary disease) ([facility] ICD-10-CM: J44.9  ICD-9-CM: 160  2018 - Present Unknown        SHANNON on CPAP ICD-10-CM: G47.33, Z99.89  ICD-9-CM: 327.23, V46.8  2018 - Present Unknown        Hypernatremia ICD-10-CM: E87.0  ICD-9-CM: 276.0  2018 - Present Yes        CAD (coronary artery disease) ICD-10-CM: I25.10  ICD-9-CM: 414.00  2018 - Present Unknown        Hx of CABG ICD-10-CM: Z95.1  ICD-9-CM: V45.81  2018 - Present Unknown        * (Principal)Pancolitis ([facility] ICD-10-CM: K51.00  ICD-9-CM: 556.6  2018 - Present Yes        N&V (nausea and vomiting) ICD-10-CM: R11.2  ICD-9-CM: 787.01  2018 - Present Yes        Essential hypertension (Chronic) ICD-10-CM: I10  ICD-9-CM: 401.9  2018 - Present Yes        Panlobular emphysema ([address] (Chronic) ICD-10-CM: J43.1  ICD-9-CM: 492.8  2018 - Present Yes        Anemia (Chronic) ICD-10-CM: D64.9  ICD-9-CM: 285.9  2018 - Present Yes        Chronic pulmonary embolism ([facility] (Chronic) ICD-10-CM: W93.66  ICD-9-CM: 416.2  2018 - Present Yes    Overview Signed 2018 11:17 PM by Genesis Platt MD     Dx early 2018; on [facility]:       Mr. Silva Suarez is a 63 yo male with PMH of gastric cancer s/p gastrectomy, chronic anemia followed by [facility] hematology, DVT/PE on xarelto since 6,2018 [facility], CAD on plavix s/p CABG, COPD with home O2 2 L per PCP, SHANNON who was admitted with loose BM in the setting of recently diagnosed pancolitis on cipro/flagyl, acute blood loss anemia and JAYLIN. He has been managed with IVF, PRBC and IV cipro/flagyl since 7-7-18. BC and stool studies are negative. GI consulted and s/p colonoscopy (polyps removed pending path)/ EGD (negative) on 7-11-18. No further antibiotics are indicated. IR consulted for IVC filter due to progressive anemia and recent DVT/PE-placed 7-11-18. xarelto stopped and plavix will be resumed with [facility] hematology followup. He did have some chest pain and [facility] records reviewed and noted he had NM stress testing 6-2-18 that was low risk . CT chest 6,2018 [facility] small PE.   LE duplex- bilateral DVT.   plans are for home today at discharge.            Follow up instructions and discharge meds at bottom of this note. Plan was discussed with patient and wife. All questions answered. Patient was stable at time of discharge. Diagnostic Imaging/Tests:   Xr Chest Pa Lat    Result Date: 7/6/2018  Chest 2 view dated 7/6/2018 Prior exam 11/24/2017 Confirmation: Abnormal breath sounds There is a right-sided navya. Metallic sternal wires are present. Heart is normal in size and mediastinum unremarkable. Vascularity is normal. There is hazy density at the left base consistent atelectasis or infiltrate. There is no pleural effusion. IMPRESSION: Small area of atelectasis or infiltrate left lower lobe      Echocardiogram results:  No results found for this visit on 07/06/18.       All Micro Results     Procedure Component Value Units Date/Time    CULTURE, STOOL [[id number]] Collected:  07/07/18 0845    Order Status:  Completed Specimen:  Stool Updated:  07/12/18 0710     Special Requests: NO SPECIAL REQUESTS        Culture result: No Salmonella, Shigella, or Ecoli 0157 isolated. CULTURE, BLOOD [[id number]] Collected:  07/08/18 0604    Order Status:  Completed Specimen:  Blood from Blood Updated:  07/11/18 0736     Special Requests: --        RIGHT  PORT       Culture result: NO GROWTH 3 DAYS       CULTURE, BLOOD [[id number]] Collected:  07/08/18 0644    Order Status:  Completed Specimen:  Blood from Blood Updated:  07/11/18 0736     Special Requests: --        RIGHT  HAND       Culture result: NO GROWTH 3 DAYS       C. DIFFICILE/EPI PCR [[id number]] Collected:  07/07/18 0845    Order Status:  Completed Specimen:  Stool Updated:  07/08/18 1433     Special Requests: NO SPECIAL REQUESTS        Culture result: NEGATIVE                  Toxigenic C. diff NEGATIVE/ 027-NAP1-BI PRESUMPTIVE NEGATIVE    C. DIFFICILE/EPI PCR [[id number]]     Order Status:  Canceled Specimen:  Stool           Labs: Results:       BMP, Mg, Phos Recent Labs      07/12/18   0528 07/11/18   0803  07/10/18   0555   NA  144  143  144   K  3.5  3.3*  3.3*   CL  108*  109*  108*   CO2  26  27  27   AGAP  10  7  9   BUN  3*  3*  4*   CREA  0.94  0.92  0.93   CA  7.8*  7.6*  7.5*   GLU  117*  135*  82      CBC Recent Labs      07/12/18   0528  07/11/18   0803  07/10/18   0555   WBC  4.7  6.0  6.4   RBC  3.60*  3.64*  3.65*   HGB  11.9*  12.2*  12.1*   HCT  34.9*  34.8*  34.6*   PLT  109*  97*  92*   GRANS  87*  68  73   LYMPH  8*  20  14   EOS  0*  1  1   MONOS  5  10  12   BASOS  0  1  0   IG  0  0  0   ANEU  4.1  4.1  4.6   ABL  0.4*  1.2  0.9   IRINA  0.0  0.1  0.1   ABM  0.2  0.6  0.8   ABB  0.0  0.0  0.0   AIG  0.0  0.0  0.0      LFT No results for input(s): SGOT, ALT, TBIL, AP, TP, ALB, GLOB, AGRAT, GPT in the last 72 hours.    Cardiac Testing Lab Results   Component Value Date/Time     07/06/2018 07:46 PM     11/24/2017 02:09 PM    Troponin-I, Qt. <0.02 (L) 07/10/2018 11:17 AM    Troponin-I, Qt. <0.02 (L) 07/10/2018 05:55 AM    Troponin-I, Qt. <0.02 (L) 07/10/2018 12:11 AM      Coagulation Tests Lab Results   Component Value Date/Time    Prothrombin time 13.5 07/10/2018 05:55 AM    INR 1.1 07/10/2018 05:55 AM      A1c No results found for: HBA1C, HGBE8, ZJR4PYGR   Lipid Panel No results found for: CHOL, CHOLPOCT, CHOLX, CHLST, CHOLV, 130165, HDL, LDL, LDLC, DLDLP, 675009, VLDLC, VLDL, TGLX, TRIGL, TRIGP, TGLPOCT, CHHD, CHHDX   Thyroid Panel No results found for: TSH, T4, FT4, TT3, T3U, TSHEXT     Most Recent UA No results found for: COLOR, APPRN, REFSG, AFSANEH, PROTU, GLUCU, KETU, BILU, BLDU, UROU, FAB, LEUKU     Allergies   Allergen Reactions    Abilify [Aripiprazole] Unknown (comments)    Contrast Dye [Iodine] Hives and Nausea and Vomiting     Reports he has be premedicated    Penicillins Unknown (comments)    Wellbutrin [Bupropion] Other (comments)     \"locking up\"     Immunization History   Administered Date(s) Administered    TB Skin Test (PPD) Intradermal 07/07/2018       All Labs from Last 24 Hrs:  Recent Results (from the past 24 hour(s))   METABOLIC PANEL, BASIC    Collection Time: 07/11/18  8:03 AM   Result Value Ref Range    Sodium 143 136 - 145 mmol/L    Potassium 3.3 (L) 3.5 - 5.1 mmol/L    Chloride 109 (H) 98 - 107 mmol/L    CO2 27 21 - 32 mmol/L    Anion gap 7 7 - 16 mmol/L    Glucose 135 (H) 65 - 100 mg/dL    BUN 3 (L) 6 - 23 MG/DL    Creatinine 0.92 0.8 - 1.5 MG/DL    GFR est AA >60 >60 ml/min/1.73m2    GFR est non-AA >60 >60 ml/min/1.73m2    Calcium 7.6 (L) 8.3 - 10.4 MG/DL   CBC WITH AUTOMATED DIFF    Collection Time: 07/11/18  8:03 AM   Result Value Ref Range    WBC 6.0 4.3 - 11.1 K/uL    RBC 3.64 (L) 4.23 - 5.67 M/uL    HGB 12.2 (L) 13.6 - 17.2 g/dL    HCT 34.8 (L) 41.1 - 50.3 %    MCV 95.6 79.6 - 97.8 FL    MCH 33.5 (H) 26.1 - 32.9 PG    MCHC 35.1 (H) 31.4 - 35.0 g/dL    RDW 18.0 (H) 11.9 - 14.6 %    PLATELET 97 (L) 805 - 450 K/uL    MPV 10.6 (L) 10.8 - 14.1 FL    DF AUTOMATED      NEUTROPHILS 68 43 - 78 %    LYMPHOCYTES 20 13 - 44 % MONOCYTES 10 4.0 - 12.0 %    EOSINOPHILS 1 0.5 - 7.8 %    BASOPHILS 1 0.0 - 2.0 %    IMMATURE GRANULOCYTES 0 0.0 - 5.0 %    ABS. NEUTROPHILS 4.1 1.7 - 8.2 K/UL    ABS. LYMPHOCYTES 1.2 0.5 - 4.6 K/UL    ABS. MONOCYTES 0.6 0.1 - 1.3 K/UL    ABS. EOSINOPHILS 0.1 0.0 - 0.8 K/UL    ABS. BASOPHILS 0.0 0.0 - 0.2 K/UL    ABS. IMM. GRANS. 0.0 0.0 - 0.5 K/UL   GLUCOSE, POC    Collection Time: 07/11/18  8:22 AM   Result Value Ref Range    Glucose (POC) 107 (H) 65 - 100 mg/dL   GLUCOSE, POC    Collection Time: 07/11/18 10:43 AM   Result Value Ref Range    Glucose (POC) 116 (H) 65 - 100 mg/dL   GLUCOSE, POC    Collection Time: 07/11/18 11:26 PM   Result Value Ref Range    Glucose (POC) 134 (H) 65 - 841 mg/dL   METABOLIC PANEL, BASIC    Collection Time: 07/12/18  5:28 AM   Result Value Ref Range    Sodium 144 136 - 145 mmol/L    Potassium 3.5 3.5 - 5.1 mmol/L    Chloride 108 (H) 98 - 107 mmol/L    CO2 26 21 - 32 mmol/L    Anion gap 10 7 - 16 mmol/L    Glucose 117 (H) 65 - 100 mg/dL    BUN 3 (L) 6 - 23 MG/DL    Creatinine 0.94 0.8 - 1.5 MG/DL    GFR est AA >60 >60 ml/min/1.73m2    GFR est non-AA >60 >60 ml/min/1.73m2    Calcium 7.8 (L) 8.3 - 10.4 MG/DL   CBC WITH AUTOMATED DIFF    Collection Time: 07/12/18  5:28 AM   Result Value Ref Range    WBC 4.7 4.3 - 11.1 K/uL    RBC 3.60 (L) 4.23 - 5.67 M/uL    HGB 11.9 (L) 13.6 - 17.2 g/dL    HCT 34.9 (L) 41.1 - 50.3 %    MCV 96.9 79.6 - 97.8 FL    MCH 33.1 (H) 26.1 - 32.9 PG    MCHC 34.1 31.4 - 35.0 g/dL    RDW 17.9 (H) 11.9 - 14.6 %    PLATELET 632 (L) 723 - 450 K/uL    MPV 11.4 10.8 - 14.1 FL    DF AUTOMATED      NEUTROPHILS 87 (H) 43 - 78 %    LYMPHOCYTES 8 (L) 13 - 44 %    MONOCYTES 5 4.0 - 12.0 %    EOSINOPHILS 0 (L) 0.5 - 7.8 %    BASOPHILS 0 0.0 - 2.0 %    IMMATURE GRANULOCYTES 0 0.0 - 5.0 %    ABS. NEUTROPHILS 4.1 1.7 - 8.2 K/UL    ABS. LYMPHOCYTES 0.4 (L) 0.5 - 4.6 K/UL    ABS. MONOCYTES 0.2 0.1 - 1.3 K/UL    ABS. EOSINOPHILS 0.0 0.0 - 0.8 K/UL    ABS.  BASOPHILS 0.0 0.0 - 0.2 K/UL    ABS. IMM. GRANS. 0.0 0.0 - 0.5 K/UL       Discharge Exam:  Patient Vitals for the past 24 hrs:   Temp Pulse Resp BP SpO2   07/12/18 0747 98.1 °F (36.7 °C) 68 15 142/81 92 %   07/11/18 2322 98.1 °F (36.7 °C) 65 16 136/77 92 %   07/11/18 2159 98 °F (36.7 °C) (!) 58 14 160/79 96 %   07/11/18 2107 - - - - 93 %   07/11/18 1712 - (!) 57 12 172/81 95 %   07/11/18 1707 - (!) 54 12 192/89 100 %   07/11/18 1701 - (!) 52 9 193/89 100 %   07/11/18 1656 - (!) 53 10 184/83 100 %   07/11/18 1651 - (!) 53 (!) 31 174/86 100 %   07/11/18 1646 - (!) 57 9 174/83 100 %   07/11/18 1641 - 62 9 177/81 100 %   07/11/18 1635 - (!) 56 11 167/81 100 %   07/11/18 1634 - - 13 170/85 100 %   07/11/18 1631 - (!) 55 16 194/89 100 %   07/11/18 1627 - (!) 56 18 174/84 100 %   07/11/18 1601 97.6 °F (36.4 °C) (!) 57 16 172/82 100 %   07/11/18 1541 - 62 16 156/83 99 %   07/11/18 1534 - 66 16 151/81 99 %   07/11/18 1515 - 68 16 131/78 99 %   07/11/18 1508 97.8 °F (36.6 °C) 65 14 102/58 99 %   07/11/18 1408 - (!) 54 - 175/79 96 %   07/11/18 1332 - (!) 53 - 166/74 95 %   07/11/18 1038 98.1 °F (36.7 °C) 75 18 155/89 96 %     Oxygen Therapy  O2 Sat (%): 92 % (07/12/18 0747)  Pulse via Oximetry: 52 beats per minute (07/11/18 2107)  O2 Device: Nasal cannula (07/11/18 2107)  O2 Flow Rate (L/min): 2 l/min (LHR) (07/11/18 2107)  FIO2 (%): 28 % (07/11/18 2107)  ETCO2 (mmHg): 28 mmHg (07/11/18 1651)    Intake/Output Summary (Last 24 hours) at 07/12/18 0752  Last data filed at 07/11/18 2100   Gross per 24 hour   Intake              250 ml   Output              700 ml   Net             -450 ml           General:                    Well nourished. Alert. No distress   CV:                                      RRR. No murmur, rub, or gallop. 1+ left > right edema  Lungs:                       CTAB after coughing . No wheezing, rhonchi, or rales. Abdomen:                  Soft, nontender, nondistended. Decreased BS  Extremities:               Warm and dry. Skin:                                    No rashes or jaundice. Neuro:                                 No gross focal deficits          Discharge Info:   Current Discharge Medication List      START taking these medications    Details   amLODIPine (NORVASC) 10 mg tablet Take 1 Tab by mouth daily. Qty: 90 Tab, Refills: 0      folic acid (FOLVITE) 1 mg tablet Take 1 Tab by mouth daily. Qty: 90 Tab, Refills: 0      furosemide (LASIX) 40 mg tablet Take 1 Tab by mouth daily. Qty: 90 Tab, Refills: 0      guaiFENesin ER (MUCINEX) 600 mg ER tablet Take 1 Tab by mouth every twelve (12) hours. Qty: 60 Tab, Refills: 0      metoprolol tartrate (LOPRESSOR) 50 mg tablet Take 1 Tab by mouth every twelve (12) hours. Qty: 120 Tab, Refills: 0         CONTINUE these medications which have NOT CHANGED    Details   oxyCODONE-acetaminophen (PERCOCET)  mg per tablet Take 1 Tab by mouth every six (6) hours as needed for Pain. Max Daily Amount: 4 Tabs. Qty: 15 Tab, Refills: 0               Disposition: home    Activity: Activity as tolerated  Diet: DIET NUTRITIONAL SUPPLEMENTS All Meals; Ensure Enlive  DIET NUTRITIONAL SUPPLEMENTS All Meals; Ensure Enlive  DIET DIABETIC CONSISTENT CARB Regular    Follow-up Appointments   Procedures    FOLLOW UP VISIT Appointment in: One Week 1 week PCP, 3 months with interventional radiology as scheduled, GI associates as scheduled thanks     1 week PCP, 3 months with interventional radiology as scheduled, GI associates as scheduled thanks     Standing Status:   Standing     Number of Occurrences:   1     Order Specific Question:   Appointment in     Answer: One Week         Follow-up Information     Follow up With Details Comments Flaquita Shearer [address] [place] [address] Rd  [phone]            Time spent in patient discharge planning and coordination 30 minutes.     Signed:  Jaime Gómez MD

## 2018-07-12 NOTE — PROGRESS NOTES
Discharge instructions and prescriptions given and reviewed with pt, verbalizes understanding, pt would like to take am meds prior to leaving, primary RN notified.

## 2018-07-13 LAB
BACTERIA SPEC CULT: NORMAL
BACTERIA SPEC CULT: NORMAL
SERVICE CMNT-IMP: NORMAL
SERVICE CMNT-IMP: NORMAL

## 2018-11-06 ENCOUNTER — DOCUMENTATION ONLY (OUTPATIENT)
Dept: NUTRITION | Age: 58
End: 2018-11-06

## 2018-11-06 NOTE — PROGRESS NOTES
On behalf of [facility]:    Nutrition Counseling:  Pt has not returned calls/contacted RD further to schedule appt. Will close referral for this office and notify referring provider.      David Obrien, [address], [address] [place] [address]  Outpatient Dietitian  Office: [phone]  Cell: [phone]

## 2019-05-04 ENCOUNTER — HOSPITAL ENCOUNTER (EMERGENCY)
Age: 59
Discharge: HOME | End: 2019-05-04
Attending: EMERGENCY MEDICINE
Payer: COMMERCIAL

## 2019-05-04 ENCOUNTER — APPOINTMENT (OUTPATIENT)
Dept: NUCLEAR MEDICINE | Age: 59
End: 2019-05-04
Attending: EMERGENCY MEDICINE
Payer: COMMERCIAL

## 2019-05-04 ENCOUNTER — APPOINTMENT (OUTPATIENT)
Dept: GENERAL RADIOLOGY | Age: 59
End: 2019-05-04
Attending: EMERGENCY MEDICINE
Payer: COMMERCIAL

## 2019-05-04 VITALS
HEIGHT: 68 IN | RESPIRATION RATE: 19 BRPM | OXYGEN SATURATION: 100 % | BODY MASS INDEX: 25.31 KG/M2 | DIASTOLIC BLOOD PRESSURE: 65 MMHG | WEIGHT: 167 LBS | TEMPERATURE: 98.3 F | SYSTOLIC BLOOD PRESSURE: 120 MMHG | HEART RATE: 62 BPM

## 2019-05-04 DIAGNOSIS — R07.9 CHEST PAIN, UNSPECIFIED TYPE: Primary | ICD-10-CM

## 2019-05-04 LAB
ALBUMIN SERPL-MCNC: 2.2 G/DL (ref 3.5–5)
ALBUMIN/GLOB SERPL: 0.7 {RATIO} (ref 1.2–3.5)
ALP SERPL-CCNC: 113 U/L (ref 50–136)
ALT SERPL-CCNC: 59 U/L (ref 12–65)
ANION GAP SERPL CALC-SCNC: 9 MMOL/L (ref 7–16)
AST SERPL-CCNC: 67 U/L (ref 15–37)
ATRIAL RATE: 74 BPM
ATRIAL RATE: 84 BPM
BACTERIA URNS QL MICRO: ABNORMAL /HPF
BASOPHILS # BLD: 0 K/UL (ref 0–0.2)
BASOPHILS NFR BLD: 0 % (ref 0–2)
BILIRUB SERPL-MCNC: 0.4 MG/DL (ref 0.2–1.1)
BUN SERPL-MCNC: 11 MG/DL (ref 6–23)
CALCIUM SERPL-MCNC: 7.5 MG/DL (ref 8.3–10.4)
CALCULATED P AXIS, ECG09: -51 DEGREES
CALCULATED P AXIS, ECG09: -90 DEGREES
CALCULATED R AXIS, ECG10: 26 DEGREES
CALCULATED R AXIS, ECG10: 64 DEGREES
CALCULATED T AXIS, ECG11: -14 DEGREES
CALCULATED T AXIS, ECG11: -70 DEGREES
CASTS URNS QL MICRO: 0 /LPF
CHLORIDE SERPL-SCNC: 107 MMOL/L (ref 98–107)
CO2 SERPL-SCNC: 28 MMOL/L (ref 21–32)
CREAT SERPL-MCNC: 1.61 MG/DL (ref 0.8–1.5)
CRYSTALS URNS QL MICRO: 0 /LPF
DIAGNOSIS, 93000: NORMAL
DIAGNOSIS, 93000: NORMAL
DIFFERENTIAL METHOD BLD: ABNORMAL
EOSINOPHIL # BLD: 0 K/UL (ref 0–0.8)
EOSINOPHIL NFR BLD: 0 % (ref 0.5–7.8)
EPI CELLS #/AREA URNS HPF: ABNORMAL /HPF
ERYTHROCYTE [DISTWIDTH] IN BLOOD BY AUTOMATED COUNT: 19.4 % (ref 11.9–14.6)
GLOBULIN SER CALC-MCNC: 3.1 G/DL (ref 2.3–3.5)
GLUCOSE SERPL-MCNC: 116 MG/DL (ref 65–100)
HCT VFR BLD AUTO: 24.8 % (ref 41.1–50.3)
HGB BLD-MCNC: 8.8 G/DL (ref 13.6–17.2)
IMM GRANULOCYTES # BLD AUTO: 0 K/UL (ref 0–0.5)
IMM GRANULOCYTES NFR BLD AUTO: 1 % (ref 0–5)
LYMPHOCYTES # BLD: 0.9 K/UL (ref 0.5–4.6)
LYMPHOCYTES NFR BLD: 14 % (ref 13–44)
MCH RBC QN AUTO: 34.5 PG (ref 26.1–32.9)
MCHC RBC AUTO-ENTMCNC: 35.5 G/DL (ref 31.4–35)
MCV RBC AUTO: 97.3 FL (ref 79.6–97.8)
MONOCYTES # BLD: 0.4 K/UL (ref 0.1–1.3)
MONOCYTES NFR BLD: 6 % (ref 4–12)
MUCOUS THREADS URNS QL MICRO: 0 /LPF
NEUTS SEG # BLD: 5 K/UL (ref 1.7–8.2)
NEUTS SEG NFR BLD: 79 % (ref 43–78)
NRBC # BLD: 0 K/UL (ref 0–0.2)
P-R INTERVAL, ECG05: 102 MS
PLATELET # BLD AUTO: 229 K/UL (ref 150–450)
PMV BLD AUTO: 11.7 FL (ref 9.4–12.3)
POTASSIUM SERPL-SCNC: 3.6 MMOL/L (ref 3.5–5.1)
PROT SERPL-MCNC: 5.3 G/DL (ref 6.3–8.2)
Q-T INTERVAL, ECG07: 438 MS
Q-T INTERVAL, ECG07: 498 MS
QRS DURATION, ECG06: 90 MS
QRS DURATION, ECG06: 96 MS
QTC CALCULATION (BEZET), ECG08: 486 MS
QTC CALCULATION (BEZET), ECG08: 501 MS
RBC # BLD AUTO: 2.55 M/UL (ref 4.23–5.6)
RBC #/AREA URNS HPF: ABNORMAL /HPF
SODIUM SERPL-SCNC: 144 MMOL/L (ref 136–145)
TROPONIN I BLD-MCNC: 0 NG/ML (ref 0.02–0.05)
TROPONIN I BLD-MCNC: 0.01 NG/ML (ref 0.02–0.05)
TROPONIN I SERPL-MCNC: <0.02 NG/ML (ref 0.02–0.05)
VENTRICULAR RATE, ECG03: 61 BPM
VENTRICULAR RATE, ECG03: 74 BPM
WBC # BLD AUTO: 6.3 K/UL (ref 4.3–11.1)
WBC URNS QL MICRO: ABNORMAL /HPF

## 2019-05-04 PROCEDURE — 85025 COMPLETE CBC W/AUTO DIFF WBC: CPT

## 2019-05-04 PROCEDURE — 84484 ASSAY OF TROPONIN QUANT: CPT

## 2019-05-04 PROCEDURE — 99285 EMERGENCY DEPT VISIT HI MDM: CPT | Performed by: EMERGENCY MEDICINE

## 2019-05-04 PROCEDURE — 81003 URINALYSIS AUTO W/O SCOPE: CPT | Performed by: EMERGENCY MEDICINE

## 2019-05-04 PROCEDURE — 93005 ELECTROCARDIOGRAM TRACING: CPT | Performed by: EMERGENCY MEDICINE

## 2019-05-04 PROCEDURE — A9540 TC99M MAA: HCPCS

## 2019-05-04 PROCEDURE — 71045 X-RAY EXAM CHEST 1 VIEW: CPT

## 2019-05-04 PROCEDURE — 80053 COMPREHEN METABOLIC PANEL: CPT

## 2019-05-04 PROCEDURE — 81015 MICROSCOPIC EXAM OF URINE: CPT

## 2019-05-04 RX ORDER — LEVOFLOXACIN 750 MG/1
750 TABLET ORAL DAILY
Qty: 7 TAB | Refills: 0 | Status: SHIPPED | OUTPATIENT
Start: 2019-05-04 | End: 2019-05-11

## 2019-05-04 RX ORDER — SODIUM CHLORIDE 0.9 % (FLUSH) 0.9 %
10 SYRINGE (ML) INJECTION
Status: COMPLETED | OUTPATIENT
Start: 2019-05-04 | End: 2019-05-04

## 2019-05-04 RX ADMIN — Medication 10 ML: at 13:33

## 2019-05-04 NOTE — ED PROVIDER NOTES
Patient is a 45-year-old male who presents with chest pain. Patient states pain in the middle of his chest going to the right side of his chest since he is at the paint the past couple of weeks but worsening today. States some shortness of breath, denies any nausea or vomiting, no fevers or chills, states he has had a cough productive of sputum. Past Medical History:  
Diagnosis Date  Bipolar 1 disorder ([facility]  CAD (coronary artery disease)  Cancer ([facility] stomach  Chronic obstructive pulmonary disease ([facility]  Gastrointestinal disorder   
 stomach cancer  Hypertension  Psychiatric disorder  Schizoaffective disorder ([facility]  Seizures ([facility] Past Surgical History:  
Procedure Laterality Date  ABDOMEN SURGERY PROC UNLISTED    
 stomach removed per pt  CARDIAC SURG PROCEDURE UNLIST CABG  
 COLONOSCOPY N/A 7/11/2018 COLONOSCOPY/32/ 612 performed by Lillie Goode MD at [facility] ENDOSCOPY  
 HX ORTHOPAEDIC    
 right knee replacement No family history on file. Social History Socioeconomic History  Marital status:  Spouse name: Not on file  Number of children: Not on file  Years of education: Not on file  Highest education level: Not on file Occupational History  Not on file Social Needs  Financial resource strain: Not on file  Food insecurity:  
  Worry: Not on file Inability: Not on file  Transportation needs:  
  Medical: Not on file Non-medical: Not on file Tobacco Use  Smoking status: Former Smoker  Tobacco comment: quit 30 years ago Substance and Sexual Activity  Alcohol use: No  
 Drug use: No  
 Sexual activity: Not on file Lifestyle  Physical activity:  
  Days per week: Not on file Minutes per session: Not on file  Stress: Not on file Relationships  Social connections:  
  Talks on phone: Not on file Gets together: Not on file Attends Sabianism service: Not on file Active member of club or organization: Not on file Attends meetings of clubs or organizations: Not on file Relationship status: Not on file  Intimate partner violence:  
  Fear of current or ex partner: Not on file Emotionally abused: Not on file Physically abused: Not on file Forced sexual activity: Not on file Other Topics Concern  Not on file Social History Narrative  Not on file ALLERGIES: Abilify [aripiprazole]; Contrast dye [iodine]; Penicillins; and Wellbutrin [bupropion] Review of Systems Constitutional: Negative for chills and fever. HENT: Negative for rhinorrhea and sore throat. Eyes: Negative for visual disturbance. Respiratory: Positive for cough and shortness of breath. Cardiovascular: Positive for chest pain. Negative for leg swelling. Gastrointestinal: Negative for abdominal pain, diarrhea, nausea and vomiting. Genitourinary: Negative for dysuria. Musculoskeletal: Negative for back pain and neck pain. Skin: Negative for rash. Neurological: Negative for weakness and headaches. Psychiatric/Behavioral: The patient is not nervous/anxious. Vitals:  
 05/04/19 1137 BP: 131/77 Pulse: 75 Resp: 16 Temp: 98.3 °F (36.8 °C) SpO2: 98% Weight: 75.8 kg (167 lb) Height: 5' 8\" (1.727 m) Physical Exam  
Constitutional: He is oriented to person, place, and time. He appears well-developed and well-nourished. HENT:  
Head: Normocephalic. Right Ear: External ear normal.  
Left Ear: External ear normal.  
Eyes: Pupils are equal, round, and reactive to light. Conjunctivae and EOM are normal.  
Neck: Normal range of motion. Neck supple. No tracheal deviation present. Cardiovascular: Normal rate, regular rhythm, normal heart sounds and intact distal pulses. No murmur heard. Pulmonary/Chest: Effort normal. No respiratory distress.  He has decreased breath sounds (  Slight decreased breath sounds on the right compared with left. ). Abdominal: Soft. There is no tenderness. Musculoskeletal: Normal range of motion. Neurological: He is alert and oriented to person, place, and time. No cranial nerve deficit. Skin: No rash noted. Nursing note and vitals reviewed. MDM Number of Diagnoses or Management Options Chest pain, unspecified type: new and requires workup Amount and/or Complexity of Data Reviewed Clinical lab tests: ordered and reviewed Tests in the radiology section of CPT®: ordered and reviewed Tests in the medicine section of CPT®: ordered and reviewed Review and summarize past medical records: yes Risk of Complications, Morbidity, and/or Mortality Presenting problems: high Diagnostic procedures: high Management options: high Patient Progress Patient progress: stable Procedures Recent Results (from the past 12 hour(s)) EKG, 12 LEAD, INITIAL Collection Time: 05/04/19 11:36 AM  
Result Value Ref Range Ventricular Rate 74 BPM  
 Atrial Rate 74 BPM  
 P-R Interval 102 ms QRS Duration 90 ms Q-T Interval 438 ms QTC Calculation (Bezet) 486 ms Calculated P Axis -51 degrees Calculated R Axis 26 degrees Calculated T Axis -70 degrees Diagnosis Unusual P axis, possible ectopic atrial rhythm ST & T wave abnormality, consider inferior ischemia ST & T wave abnormality, consider anterolateral ischemia Prolonged QT Abnormal ECG When compared with ECG of 09-JUL-2018 23:33, 
Ectopic atrial rhythm has replaced Sinus rhythm Confirmed by Alcus Dubin ([id number]) on 5/4/2019 3:35:12 PM 
  
POC TROPONIN-I Collection Time: 05/04/19 11:43 AM  
Result Value Ref Range Troponin-I (POC) 0 (L) 0.02 - 0.05 ng/ml CBC WITH AUTOMATED DIFF Collection Time: 05/04/19 11:44 AM  
Result Value Ref Range WBC 6.3 4.3 - 11.1 K/uL  
 RBC 2.55 (L) 4.23 - 5.6 M/uL HGB 8.8 (L) 13.6 - 17.2 g/dL HCT 24.8 (L) 41.1 - 50.3 % MCV 97.3 79.6 - 97.8 FL  
 MCH 34.5 (H) 26.1 - 32.9 PG  
 MCHC 35.5 (H) 31.4 - 35.0 g/dL  
 RDW 19.4 (H) 11.9 - 14.6 % PLATELET 157 698 - 832 K/uL MPV 11.7 9.4 - 12.3 FL ABSOLUTE NRBC 0.00 0.0 - 0.2 K/uL  
 DF AUTOMATED NEUTROPHILS 79 (H) 43 - 78 % LYMPHOCYTES 14 13 - 44 % MONOCYTES 6 4.0 - 12.0 % EOSINOPHILS 0 (L) 0.5 - 7.8 % BASOPHILS 0 0.0 - 2.0 % IMMATURE GRANULOCYTES 1 0.0 - 5.0 %  
 ABS. NEUTROPHILS 5.0 1.7 - 8.2 K/UL  
 ABS. LYMPHOCYTES 0.9 0.5 - 4.6 K/UL  
 ABS. MONOCYTES 0.4 0.1 - 1.3 K/UL  
 ABS. EOSINOPHILS 0.0 0.0 - 0.8 K/UL  
 ABS. BASOPHILS 0.0 0.0 - 0.2 K/UL  
 ABS. IMM. GRANS. 0.0 0.0 - 0.5 K/UL METABOLIC PANEL, COMPREHENSIVE Collection Time: 05/04/19 11:44 AM  
Result Value Ref Range Sodium 144 136 - 145 mmol/L Potassium 3.6 3.5 - 5.1 mmol/L Chloride 107 98 - 107 mmol/L  
 CO2 28 21 - 32 mmol/L Anion gap 9 7 - 16 mmol/L Glucose 116 (H) 65 - 100 mg/dL BUN 11 6 - 23 MG/DL Creatinine 1.61 (H) 0.8 - 1.5 MG/DL  
 GFR est AA 57 (L) >60 ml/min/1.73m2 GFR est non-AA 47 (L) >60 ml/min/1.73m2 Calcium 7.5 (L) 8.3 - 10.4 MG/DL Bilirubin, total 0.4 0.2 - 1.1 MG/DL  
 ALT (SGPT) 59 12 - 65 U/L  
 AST (SGOT) 67 (H) 15 - 37 U/L Alk. phosphatase 113 50 - 136 U/L Protein, total 5.3 (L) 6.3 - 8.2 g/dL Albumin 2.2 (L) 3.5 - 5.0 g/dL Globulin 3.1 2.3 - 3.5 g/dL A-G Ratio 0.7 (L) 1.2 - 3.5    
TROPONIN I Collection Time: 05/04/19 11:44 AM  
Result Value Ref Range Troponin-I, Qt. <0.02 (L) 0.02 - 0.05 NG/ML  
URINE MICROSCOPIC Collection Time: 05/04/19  1:07 PM  
Result Value Ref Range WBC 3-5 0 /hpf  
 RBC 0-3 0 /hpf Epithelial cells 0-3 0 /hpf Bacteria 3+ (H) 0 /hpf Casts 0 0 /lpf Crystals, urine 0 0 /LPF Mucus 0 0 /lpf POC TROPONIN-I Collection Time: 05/04/19  3:31 PM  
Result Value Ref Range Troponin-I (POC) 0.01 (L) 0.02 - 0.05 ng/ml Xr Chest Sngl V 
 
 Result Date: 5/4/2019 History: Chest pain and shortness of breath Exam: portable chest Comparison: 7/6/2018 Findings: There is a small right pleural effusion. The left lung is clear. Stable postoperative change noted in the chest. No pneumothorax. Impressions: Right pleural effusion. Nm Lung Perfusion W Vent Result Date: 5/4/2019 History: Shortness of breath EXAM: Nuclear medicine ventilation/perfusion scan TECHNIQUE: 39.7 mCi aerosolized technetium 99m DTPA is administered followed by 6.5 mCi technetium 99m MAA. COMPARISON: Plain film dated 5/4/2019 FINDINGS: There is asymmetric radiotracer activity seen within the posterior left lung on both the ventilation and perfusion portions of the exam. No mismatched ventilation/perfusion defects demonstrated. IMPRESSION: Matched defect within the posterior left lower lung. Low probability pulmonary embolus. 59-year-old male who presents with chest pain and shortness of breath: 
 
  
I discussed above VQ result with Dr. Senia Sethi from pulmonology who states given patient already on eloquis and low risk for PE no change in medications. Patient has follow up with PCP Monday for further evaluation of this pleural effusion and O2 sat 100% on his home 2L with no increased WOB and no chest pain at this time. Chest pain was atypical and on right side, Delta troponin remain WNL, EKG was initially concerning and discussed with Dr. Alberto Vigil who reviewed EKG and states likely artifact and no acute concern and on repeat EKG ST changes at V2 have been replaced by normal ST segment in V2. Patient will be placed on levaquin for this UTI in the setting of pleural effusion (which I feel is likely etiology of this right sided chest pain patient had) for coverage also of possible pneumonia. He agrees to return with any SOB, any return of chest pain or any nausea or vomiting, fevers or chills or any further concerns.

## 2019-05-04 NOTE — ED TRIAGE NOTES
Patient reports chest pain and right sided abdominal/flank pain. Started yesterday. States pain in mid chest radiating into back/shoulder blades. States nausea and some diarrhea.  Shortness of breath as well, always wears 2 L NC

## 2019-05-04 NOTE — ED NOTES
I have reviewed discharge instructions with the patient. The patient verbalized understanding. Patient left ED via Discharge Method: wheelchair to Home with family. Opportunity for questions and clarification provided. Patient given 1 scripts. To continue your aftercare when you leave the hospital, you may receive an automated call from our care team to check in on how you are doing. This is a free service and part of our promise to provide the best care and service to meet your aftercare needs.  If you have questions, or wish to unsubscribe from this service please call [phone]. Thank you for Choosing our [facility] Emergency Department.

## 2019-11-13 NOTE — ED TRIAGE NOTES
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

## (undated) DEVICE — NDL PRT INJ NSAF BLNT 18GX1.5 --

## (undated) DEVICE — BLOCK BITE AD 60FR W/ VELC STRP ADDRESSES MOST PT AND

## (undated) DEVICE — CANNULA NSL ORAL AD FOR CAPNOFLEX CO2 O2 AIRLFE

## (undated) DEVICE — FORCEPS BX L240CM JAW DIA2.8MM L CAP W/ NDL MIC MESH TOOTH

## (undated) DEVICE — KENDALL RADIOLUCENT FOAM MONITORING ELECTRODE RECTANGULAR SHAPE: Brand: KENDALL

## (undated) DEVICE — SYR 3ML LL TIP 1/10ML GRAD --

## (undated) DEVICE — CONNECTOR TBNG OD5-7MM O2 END DISP

## (undated) DEVICE — SYR 5ML 1/5 GRAD LL NSAF LF --

## (undated) DEVICE — CONTAINER PREFIL FRMLN 40ML --